# Patient Record
Sex: FEMALE | Race: WHITE | NOT HISPANIC OR LATINO | Employment: PART TIME | URBAN - METROPOLITAN AREA
[De-identification: names, ages, dates, MRNs, and addresses within clinical notes are randomized per-mention and may not be internally consistent; named-entity substitution may affect disease eponyms.]

---

## 2022-04-04 ENCOUNTER — TRANSFERRED RECORDS (OUTPATIENT)
Dept: MULTI SPECIALTY CLINIC | Facility: CLINIC | Age: 42
End: 2022-04-04

## 2022-04-04 LAB — PAP SMEAR - HIM PATIENT REPORTED: NEGATIVE

## 2023-03-17 ENCOUNTER — E-VISIT (OUTPATIENT)
Dept: URGENT CARE | Facility: CLINIC | Age: 43
End: 2023-03-17
Payer: COMMERCIAL

## 2023-03-17 DIAGNOSIS — N76.0 ACUTE VAGINITIS: Primary | ICD-10-CM

## 2023-03-17 PROCEDURE — 99421 OL DIG E/M SVC 5-10 MIN: CPT | Performed by: PREVENTIVE MEDICINE

## 2023-03-17 NOTE — PATIENT INSTRUCTIONS
Thank you for choosing us for your care. Given your symptoms, I would like you to do a lab-only visit to determine what is causing them.  I have placed the orders.  Please schedule an appointment with the lab right here in [facility], or call [phone].  I will let you know when the results are back and next steps to take.    Preventing Vaginitis     Use mild, unscented soap when you bathe or shower to avoid irritating your vagina.      Vaginitis is irritation or infection of the vagina or the outside opening of it (vulva). Vaginitis can be caused by bacteria, viruses, parasites, or yeast. Chemicals such as in perfumes or soaps or in spermicides can sometimes be a cause. Vaginitis can be caused by hormone changes in pregnancy or with menopause. You can help prevent vaginitis. Follow the tips below. And see your healthcare provider if you have any symptoms.   Hygiene    Stay away from chemicals. Don't use vaginal sprays. Don't use scented toilet paper or tampons that are scented. Sprays and scents have chemicals that can irritate your vagina.    Don't douche unless you are told to by your healthcare provider. Douching is rarely needed. And it upsets the normal balance in the vagina.    Wash yourself well. Wash the outer vaginal area (vulva) every day with mild, unscented soap. Keep it as dry as possible.    Wipe correctly. Make sure to wipe from front to back after a bowel movement. This helps keep from spreading bacteria from your anus to your vagina.    Change your tampon often. During your period, make sure to change your tampon as often as directed on the package. This allows the normal flow of vaginal discharge and blood.    Lifestyle    Limit your number of sexual partners. The more partners you have, the greater your risk of infection. Using condoms helps reduce your risk.    Get enough sleep. Sleep helps keep your body s immune system healthy. This helps you fight infection.    Lose weight, if needed.  Excess weight can reduce air circulation around your vagina. This can increase your risk of infection.    Exercise regularly. Regular activity helps keep your body healthy.    Take antibiotics only as directed. Antibiotics can change the normal chemical balance in the vagina.      Clothing    Don t sit in wet clothes. Yeast thrives when it s warm and damp.    Don t wear tight pants. And don t wear tights, leggings, or hose without a cotton crotch. These types of clothing trap warmth and moisture.    Wear cotton underwear. Cotton lets air circulate around the vagina.    Symptoms of vaginitis    Irritation, swelling, or itching of the genital area    Vaginal discharge    Bad vaginal odor    Pain or burning during urination    [employer] last reviewed this educational content on 11/1/2019 2000-2022 [employer]. All rights reserved. This information is not intended as a substitute for professional medical care. Always follow your healthcare professional's instructions.

## 2023-05-20 ENCOUNTER — HEALTH MAINTENANCE LETTER (OUTPATIENT)
Age: 43
End: 2023-05-20

## 2024-04-02 ENCOUNTER — OFFICE VISIT (OUTPATIENT)
Dept: FAMILY MEDICINE | Facility: CLINIC | Age: 44
End: 2024-04-02
Payer: COMMERCIAL

## 2024-04-02 VITALS
DIASTOLIC BLOOD PRESSURE: 66 MMHG | BODY MASS INDEX: 28.7 KG/M2 | SYSTOLIC BLOOD PRESSURE: 118 MMHG | HEIGHT: 63 IN | OXYGEN SATURATION: 98 % | TEMPERATURE: 98.1 F | WEIGHT: 162 LBS | HEART RATE: 77 BPM

## 2024-04-02 DIAGNOSIS — R19.7 DIARRHEA, UNSPECIFIED TYPE: ICD-10-CM

## 2024-04-02 DIAGNOSIS — Z11.59 NEED FOR HEPATITIS C SCREENING TEST: ICD-10-CM

## 2024-04-02 DIAGNOSIS — Z00.00 ROUTINE GENERAL MEDICAL EXAMINATION AT A HEALTH CARE FACILITY: Primary | ICD-10-CM

## 2024-04-02 DIAGNOSIS — Z13.6 CARDIOVASCULAR SCREENING; LDL GOAL LESS THAN 160: ICD-10-CM

## 2024-04-02 LAB
ALBUMIN SERPL BCG-MCNC: 4.5 G/DL (ref 3.5–5.2)
ALP SERPL-CCNC: 66 U/L (ref 40–150)
ALT SERPL W P-5'-P-CCNC: 28 U/L (ref 0–50)
ANION GAP SERPL CALCULATED.3IONS-SCNC: 10 MMOL/L (ref 7–15)
AST SERPL W P-5'-P-CCNC: 21 U/L (ref 0–45)
BASOPHILS # BLD AUTO: 0 10E3/UL (ref 0–0.2)
BASOPHILS NFR BLD AUTO: 1 %
BILIRUB SERPL-MCNC: 0.7 MG/DL
BUN SERPL-MCNC: 11.2 MG/DL (ref 6–20)
C DIFF TOX B STL QL: NEGATIVE
CALCIUM SERPL-MCNC: 8.6 MG/DL (ref 8.6–10)
CHLORIDE SERPL-SCNC: 106 MMOL/L (ref 98–107)
CHOLEST SERPL-MCNC: 183 MG/DL
CREAT SERPL-MCNC: 0.84 MG/DL (ref 0.51–0.95)
CRP SERPL-MCNC: <3 MG/L
DEPRECATED HCO3 PLAS-SCNC: 24 MMOL/L (ref 22–29)
EGFRCR SERPLBLD CKD-EPI 2021: 87 ML/MIN/1.73M2
EOSINOPHIL # BLD AUTO: 0.2 10E3/UL (ref 0–0.7)
EOSINOPHIL NFR BLD AUTO: 3 %
ERYTHROCYTE [DISTWIDTH] IN BLOOD BY AUTOMATED COUNT: 12 % (ref 10–15)
ERYTHROCYTE [SEDIMENTATION RATE] IN BLOOD BY WESTERGREN METHOD: 3 MM/HR (ref 0–20)
FASTING STATUS PATIENT QL REPORTED: NO
FERRITIN SERPL-MCNC: 230 NG/ML (ref 6–175)
GLUCOSE SERPL-MCNC: 99 MG/DL (ref 70–99)
HCT VFR BLD AUTO: 40.1 % (ref 35–47)
HCV AB SERPL QL IA: NONREACTIVE
HDLC SERPL-MCNC: 69 MG/DL
HGB BLD-MCNC: 13.3 G/DL (ref 11.7–15.7)
IMM GRANULOCYTES # BLD: 0 10E3/UL
IMM GRANULOCYTES NFR BLD: 0 %
LDLC SERPL CALC-MCNC: 93 MG/DL
LYMPHOCYTES # BLD AUTO: 2.4 10E3/UL (ref 0.8–5.3)
LYMPHOCYTES NFR BLD AUTO: 27 %
MCH RBC QN AUTO: 33.8 PG (ref 26.5–33)
MCHC RBC AUTO-ENTMCNC: 33.2 G/DL (ref 31.5–36.5)
MCV RBC AUTO: 102 FL (ref 78–100)
MONOCYTES # BLD AUTO: 0.6 10E3/UL (ref 0–1.3)
MONOCYTES NFR BLD AUTO: 7 %
NEUTROPHILS # BLD AUTO: 5.4 10E3/UL (ref 1.6–8.3)
NEUTROPHILS NFR BLD AUTO: 62 %
NONHDLC SERPL-MCNC: 114 MG/DL
PLATELET # BLD AUTO: 216 10E3/UL (ref 150–450)
POTASSIUM SERPL-SCNC: 4.1 MMOL/L (ref 3.4–5.3)
PROT SERPL-MCNC: 6.5 G/DL (ref 6.4–8.3)
RBC # BLD AUTO: 3.93 10E6/UL (ref 3.8–5.2)
SODIUM SERPL-SCNC: 140 MMOL/L (ref 135–145)
TRIGL SERPL-MCNC: 104 MG/DL
TSH SERPL DL<=0.005 MIU/L-ACNC: 2.59 UIU/ML (ref 0.3–4.2)
WBC # BLD AUTO: 8.6 10E3/UL (ref 4–11)

## 2024-04-02 PROCEDURE — 36415 COLL VENOUS BLD VENIPUNCTURE: CPT | Performed by: PHYSICIAN ASSISTANT

## 2024-04-02 PROCEDURE — 99214 OFFICE O/P EST MOD 30 MIN: CPT | Mod: 25 | Performed by: PHYSICIAN ASSISTANT

## 2024-04-02 PROCEDURE — 84443 ASSAY THYROID STIM HORMONE: CPT | Performed by: PHYSICIAN ASSISTANT

## 2024-04-02 PROCEDURE — 86140 C-REACTIVE PROTEIN: CPT | Performed by: PHYSICIAN ASSISTANT

## 2024-04-02 PROCEDURE — 85652 RBC SED RATE AUTOMATED: CPT | Performed by: PHYSICIAN ASSISTANT

## 2024-04-02 PROCEDURE — 87493 C DIFF AMPLIFIED PROBE: CPT | Mod: 59 | Performed by: PHYSICIAN ASSISTANT

## 2024-04-02 PROCEDURE — 99386 PREV VISIT NEW AGE 40-64: CPT | Performed by: PHYSICIAN ASSISTANT

## 2024-04-02 PROCEDURE — 83993 ASSAY FOR CALPROTECTIN FECAL: CPT | Performed by: PHYSICIAN ASSISTANT

## 2024-04-02 PROCEDURE — 87338 HPYLORI STOOL AG IA: CPT | Performed by: PHYSICIAN ASSISTANT

## 2024-04-02 PROCEDURE — 80061 LIPID PANEL: CPT | Performed by: PHYSICIAN ASSISTANT

## 2024-04-02 PROCEDURE — 80053 COMPREHEN METABOLIC PANEL: CPT | Performed by: PHYSICIAN ASSISTANT

## 2024-04-02 PROCEDURE — 86803 HEPATITIS C AB TEST: CPT | Performed by: PHYSICIAN ASSISTANT

## 2024-04-02 PROCEDURE — 85025 COMPLETE CBC W/AUTO DIFF WBC: CPT | Performed by: PHYSICIAN ASSISTANT

## 2024-04-02 PROCEDURE — 82728 ASSAY OF FERRITIN: CPT | Performed by: PHYSICIAN ASSISTANT

## 2024-04-02 PROCEDURE — 87507 IADNA-DNA/RNA PROBE TQ 12-25: CPT | Performed by: PHYSICIAN ASSISTANT

## 2024-04-02 SDOH — HEALTH STABILITY: PHYSICAL HEALTH: ON AVERAGE, HOW MANY DAYS PER WEEK DO YOU ENGAGE IN MODERATE TO STRENUOUS EXERCISE (LIKE A BRISK WALK)?: 4 DAYS

## 2024-04-02 ASSESSMENT — SOCIAL DETERMINANTS OF HEALTH (SDOH): HOW OFTEN DO YOU GET TOGETHER WITH FRIENDS OR RELATIVES?: TWICE A WEEK

## 2024-04-02 NOTE — Clinical Note
Please abstract the following data from this visit with this patient into the appropriate field in Epic:  Tests that can be patient reported without a hard copy:  Pap smear done on this date: 4/4/2022 (approximately), by this group: [facility], results were NIL, negative HPV.

## 2024-04-02 NOTE — PROGRESS NOTES
"Preventive Care Visit  [facility] NICHOLE Pérez PA-C, Family Medicine  Apr 2, 2024      Assessment & Plan     (Z00.00) Routine general medical examination at a health care facility  (primary encounter diagnosis)  Comment:   Plan: Lipid panel reflex to direct LDL Non-fasting            (R19.7) Diarrhea, unspecified type  Comment: abrupt onset 6 weeks ago with no slowing down. No recent abx use. No recent travel. On no prescription or over the counter medications. Discussed starting with labs and stools studies today. If all normal, would proceed to colonoscopy and given report or diarrhea being black, would consider addition of endoscopy. Denies upper GI symptoms and is taking pepto regularly but states stools were that color prior to initiation of pepto  Plan: CBC with platelets and differential, Ferritin,         C. difficile Toxin B PCR with reflex to C.         difficile Antigen and Toxins A/B EIA, Enteric         Bacteria and Virus Panel by [facility] Stool,         Calprotectin Feces, ESR: Erythrocyte         sedimentation rate, CRP, inflammation,         Helicobacter pylori Antigen Stool, TSH with         free T4 reflex, Comprehensive metabolic panel         (BMP + Alb, Alk Phos, ALT, AST, Total. Bili,         TP)            (Z11.59) Need for hepatitis C screening test  Comment:   Plan: Hepatitis C Screen Reflex to HCV RNA Quant and         Genotype            (Z13.6) CARDIOVASCULAR SCREENING; LDL GOAL LESS THAN 160  Comment:   Plan: Lipid panel reflex to direct LDL Non-fasting                Patient has been advised of split billing requirements and indicates understanding: Yes    BMI  Estimated body mass index is 28.7 kg/m  as calculated from the following:    Height as of this encounter: 1.6 m (5' 3\").    Weight as of this encounter: 73.5 kg (162 lb).       Counseling  Appropriate preventive services were discussed with this patient, including applicable screening as appropriate for fall " prevention, nutrition, physical activity, Tobacco-use cessation, weight loss and cognition.  Checklist reviewing preventive services available has been given to the patient.  Reviewed patient's diet, addressing concerns and/or questions.   She is at risk for psychosocial distress and has been provided with information to reduce risk.   The patient reports drinking more than one alcoholic drink per day and sometimes engages in binge or excessive drinking. The patient was counseled and given information about possible harmful effects of excessive alcohol intake as well as where to get help for alcohol problems.     Subjective   Glenda Chang is a 44 year old, presenting for the following:  Physical        4/2/2024     8:50 AM   Additional Questions   Roomed by HUGH Chaney        Health Care Directive  Patient does not have a Health Care Directive or Living Will: Discussed advance care planning with patient; however, patient declined at this time.    HPI    -She has been having diarrhea for the last 6 weeks.     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 They did go to [place] in Feb but this had already been going on. Interestingly diarrhea was not as bad in [place] - still present but less so and then when she got home increased in frequency again    Stools prior to this were 'normal' - typically 2x/day  No h/o IBS issues or IBD  Has a cousin with Crohns otherwise no family history    Other than some fatigue she has been feeling okay  No  change in appetite  No weight loss    She does do her women's health care (PAP and mammos) through [facility]. Last PAP ~2 years ago. No h/o abnormal PAPs      4/2/2024   General Health   How would you rate your overall physical health? (!) FAIR   Feel stress (tense, anxious, or unable to sleep) Only a little   (!) STRESS CONCERN      4/2/2024   Nutrition   Three or more servings of calcium each day? Yes   Diet: Regular (no restrictions)   How many servings of fruit and vegetables per day? (!) 2-3   How many sweetened beverages each day? (!) 2         4/2/2024   Exercise   Days per week of moderate/strenous exercise 4 days         4/2/2024   Social Factors   Frequency of gathering with friends or relatives Twice a week   Worry food won't last until get money to buy more No   Food not last or not have enough money for food? No   Do you have housing?  Yes   Are you worried about losing your housing? No   Lack of transportation? No   Unable to get utilities (heat,electricity)? No         4/2/2024   Dental   Dentist two times every year? Yes         4/2/2024   TB Screening   Were you born outside of the [place]? No         Today's PHQ-2 Score:       4/2/2024     8:48 AM   PHQ-2 ( 1999 [employer])   Q1: Little interest or pleasure in doing things 0   Q2: Feeling down, depressed or hopeless 0   PHQ-2 Score 0   Q1: Little interest or pleasure in doing things Not at all   Q2: Feeling down, depressed or hopeless Not at all   PHQ-2 Score 0           4/2/2024   Substance Use   Alcohol more than 3/day or more than 7/wk Yes   How often do you have a drink containing alcohol 2 to 3 times a week   How many alcohol drinks on typical day 1 or 2   How often do you have 5+ drinks at one occasion Never   Audit 2/3 Score 0   How often not able to stop drinking once started Never   How often failed to do what normally expected Never   How often needed first drink in am after a heavy drinking session Never   How often feeling of guilt or  remorse after drinking Never   How often unable to remember what happened the night before Never   Have you or someone else been injured because of your drinking No   Has anyone been concerned or suggested you cut down on drinking No   TOTAL SCORE - AUDIT 3   Do you use any other substances recreationally? No     Social History     Tobacco Use    Smoking status: Former    Smokeless tobacco: Never   Substance Use Topics    Alcohol use: No    Drug use: No             4/2/2024   Breast Cancer Screening   Family history of breast, colon, or ovarian cancer? Yes         4/2/2024   LAST FHS-7 RESULTS   1st degree relative breast or ovarian cancer No   Any relative bilateral breast cancer No   Any male have breast cancer No   Any ONE woman have BOTH breast AND ovarian cancer No   Any woman with breast cancer before 50yrs No   2 or more relatives with breast AND/OR ovarian cancer No   2 or more relatives with breast AND/OR bowel cancer No       Mammogram Screening - Mammogram every 1-2 years updated in Health Maintenance based on mutual decision making        4/2/2024   STI Screening   New sexual partner(s) since last STI/HIV test? No     History of abnormal Pap smear: NO - age 30-65 PAP every 5 years with negative HPV co-testing recommended       ASCVD Risk   The ASCVD Risk score (Anurag JAIME, et al., 2019) failed to calculate for the following reasons:    Cannot find a previous HDL lab    Cannot find a previous total cholesterol lab        4/2/2024   Contraception/Family Planning   Questions about contraception or family planning No        Reviewed and updated as needed this visit by Provider                    BP Readings from Last 3 Encounters:   04/02/24 118/66    Wt Readings from Last 3 Encounters:   04/02/24 73.5 kg (162 lb)   09/29/15 78 kg (172 lb)                      Review of Systems  CONSTITUTIONAL: NEGATIVE for fever, chills, change in weight  INTEGUMENTARY/SKIN: NEGATIVE for worrisome rashes, moles or  "lesions  EYES: NEGATIVE for vision changes or irritation  ENT/MOUTH: NEGATIVE for ear, mouth and throat problems  RESP: NEGATIVE for significant cough or SOB  BREAST: NEGATIVE for masses, tenderness or discharge  CV: NEGATIVE for chest pain, palpitations or peripheral edema  GI: POSITIVE for diarrhea as noted above  : NEGATIVE for frequency, dysuria, or hematuria  MUSCULOSKELETAL: NEGATIVE for significant arthralgias or myalgia  NEURO: NEGATIVE for weakness, dizziness or paresthesias  ENDOCRINE: NEGATIVE for temperature intolerance, skin/hair changes  HEME: NEGATIVE for bleeding problems  PSYCHIATRIC: NEGATIVE for changes in mood or affect     Objective    Exam  /66   Pulse 77   Temp 98.1  F (36.7  C) (Tympanic)   Ht 1.6 m (5' 3\")   Wt 73.5 kg (162 lb)   SpO2 98%   BMI 28.70 kg/m     Estimated body mass index is 28.7 kg/m  as calculated from the following:    Height as of this encounter: 1.6 m (5' 3\").    Weight as of this encounter: 73.5 kg (162 lb).    Physical Exam  GENERAL: alert and no distress  EYES: Eyes grossly normal to inspection, PERRL and conjunctivae and sclerae normal  HENT: ear canals and TM's normal, nose and mouth without ulcers or lesions  NECK: no adenopathy, no asymmetry, masses, or scars  RESP: lungs clear to auscultation - no rales, rhonchi or wheezes  CV: regular rate and rhythm, normal S1 S2, no S3 or S4, no murmur, click or rub, no peripheral edema  ABDOMEN: soft, nontender, no hepatosplenomegaly, no masses and bowel sounds normal  MS: no gross musculoskeletal defects noted, no edema  SKIN: no suspicious lesions or rashes  NEURO: Normal strength and tone, mentation intact and speech normal  PSYCH: mentation appears normal, affect normal/bright        Signed Electronically by: Rosa Pérez PA-C    "

## 2024-04-02 NOTE — PATIENT INSTRUCTIONS
Preventive Care Advice   This is general advice given by our system to help you stay healthy. However, your care team may have specific advice just for you. Please talk to your care team about your preventive care needs.  Nutrition  Eat 5 or more servings of fruits and vegetables each day.  Try wheat bread, brown rice and whole grain pasta (instead of white bread, rice, and pasta).  Get enough calcium and vitamin D. Check the label on foods and aim for 100% of the RDA (recommended daily allowance).  Lifestyle  Exercise at least 150 minutes each week   (30 minutes a day, 5 days a week).  Do muscle strengthening activities 2 days a week. These help control your weight and prevent disease.  No smoking.  Wear sunscreen to prevent skin cancer.  Have a dental exam and cleaning every 6 months.  Yearly exams  See your health care team every year to talk about:  Any changes in your health.  Any medicines your care team has prescribed.  Preventive care, family planning, and ways to prevent chronic diseases.  Shots (vaccines)   HPV shots (up to age 26), if you've never had them before.  Hepatitis B shots (up to age 59), if you've never had them before.  COVID-19 shot: Get this shot when it's due.  Flu shot: Get a flu shot every year.  Tetanus shot: Get a tetanus shot every 10 years.  Pneumococcal, hepatitis A, and RSV shots: Ask your care team if you need these based on your risk.  Shingles shot (for age 50 and up).  General health tests  Diabetes screening:  Starting at age 35, Get screened for diabetes at least every 3 years.  If you are younger than age 35, ask your care team if you should be screened for diabetes.  Cholesterol test: At age 39, start having a cholesterol test every 5 years, or more often if advised.  Bone density scan (DEXA): At age 50, ask your care team if you should have this scan for osteoporosis (brittle bones).  Hepatitis C: Get tested at least once in your life.  STIs (sexually transmitted  infections)  Before age 24: Ask your care team if you should be screened for STIs.  After age 24: Get screened for STIs if you're at risk. You are at risk for STIs (including HIV) if:  You are sexually active with more than one person.  You don't use condoms every time.  You or a partner was diagnosed with a sexually transmitted infection.  If you are at risk for HIV, ask about PrEP medicine to prevent HIV.  Get tested for HIV at least once in your life, whether you are at risk for HIV or not.  Cancer screening tests  Cervical cancer screening: If you have a cervix, begin getting regular cervical cancer screening tests at age 21. Most people who have regular screenings with normal results can stop after age 65. Talk about this with your provider.  Breast cancer scan (mammogram): If you've ever had breasts, begin having regular mammograms starting at age 40. This is a scan to check for breast cancer.  Colon cancer screening: It is important to start screening for colon cancer at age 45.  Have a colonoscopy test every 10 years (or more often if you're at risk) Or, ask your provider about stool tests like a FIT test every year or Cologuard test every 3 years.  To learn more about your testing options, visit: https://www.[place] [employer]/293601.pdf.  For help making a decision, visit: https://bit.ly/wr84329.  Prostate cancer screening test: If you have a prostate and are age 55 to 69, ask your provider if you would benefit from a yearly prostate cancer screening test.  Lung cancer screening: If you are a current or former smoker age 50 to 80, ask your care team if ongoing lung cancer screenings are right for you.  For informational purposes only. Not to replace the advice of your health care provider. Copyright   2023 [employer]. All rights reserved. Clinically reviewed by the [facility]. [place] 387302 - REV 01/24.    Learning About Stress  What is stress?     Stress is your  body's response to a hard situation. Your body can have a physical, emotional, or mental response. Stress is a fact of life for most people, and it affects everyone differently. What causes stress for you may not be stressful for someone else.  A lot of things can cause stress. You may feel stress when you go on a job interview, take a test, or run a race. This kind of short-term stress is normal and even useful. It can help you if you need to work hard or react quickly. For example, stress can help you finish an important job on time.  Long-term stress is caused by ongoing stressful situations or events. Examples of long-term stress include long-term health problems, ongoing problems at work, or conflicts in your family. Long-term stress can harm your health.  How does stress affect your health?  When you are stressed, your body responds as though you are in danger. It makes hormones that speed up your heart, make you breathe faster, and give you a burst of energy. This is called the fight-or-flight stress response. If the stress is over quickly, your body goes back to normal and no harm is done.  But if stress happens too often or lasts too long, it can have bad effects. Long-term stress can make you more likely to get sick, and it can make symptoms of some diseases worse. If you tense up when you are stressed, you may develop neck, shoulder, or low back pain. Stress is linked to high blood pressure and heart disease.  Stress also harms your emotional health. It can make you armenta, tense, or depressed. Your relationships may suffer, and you may not do well at work or school.  What can you do to manage stress?  You can try these things to help manage stress:   Do something active. Exercise or activity can help reduce stress. Walking is a great way to get started. Even everyday activities such as housecleaning or yard work can help.  Try yoga or dorys chi. These techniques combine exercise and meditation. You may need  some training at first to learn them.  Do something you enjoy. For example, listen to music or go to a movie. Practice your hobby or do volunteer work.  Meditate. This can help you relax, because you are not worrying about what happened before or what may happen in the future.  Do guided imagery. Imagine yourself in any setting that helps you feel calm. You can use online videos, books, or a teacher to guide you.  Do breathing exercises. For example:  From a standing position, bend forward from the waist with your knees slightly bent. Let your arms dangle close to the floor.  Breathe in slowly and deeply as you return to a standing position. Roll up slowly and lift your head last.  Hold your breath for just a few seconds in the standing position.  Breathe out slowly and bend forward from the waist.  Let your feelings out. Talk, laugh, cry, and express anger when you need to. Talking with supportive friends or family, a counselor, or a quin leader about your feelings is a healthy way to relieve stress. Avoid discussing your feelings with people who make you feel worse.  Write. It may help to write about things that are bothering you. This helps you find out how much stress you feel and what is causing it. When you know this, you can find better ways to cope.  What can you do to prevent stress?  You might try some of these things to help prevent stress:  Manage your time. This helps you find time to do the things you want and need to do.  Get enough sleep. Your body recovers from the stresses of the day while you are sleeping.  Get support. Your family, friends, and community can make a difference in how you experience stress.  Limit your news feed. Avoid or limit time on social media or news that may make you feel stressed.  Do something active. Exercise or activity can help reduce stress. Walking is a great way to get started.  Where can you learn more?  Go to https://www.healthwise.net/patiented  Enter N032 in the  "search box to learn more about \"Learning About Stress.\"  Current as of: October 24, 2023               Content Version: 14.0    [id number] [employer].   Care instructions adapted under license by your healthcare professional. If you have questions about a medical condition or this instruction, always ask your healthcare professional. [employer] disclaims any warranty or liability for your use of this information.      Learning About Alcohol Use Disorder  What is alcohol use disorder?  Alcohol use disorder means that a person drinks alcohol even though it causes harm to themselves or others. It can range from mild to severe. The more symptoms of this disorder you have, the more severe it may be. People who have it may find it hard to control their use of alcohol.  People who have this disorder may argue with others about how much they're drinking. Their job may be affected because of drinking. They may drink when it's dangerous or illegal, such as when they drive. They also may have a strong need, or craving, to drink. They may feel like they must drink just to get by. Their drinking may increase their risk of getting hurt or being in a car crash.  Over time, drinking too much alcohol may cause health problems. These may include high blood pressure, liver problems, or problems with digestion.  What are the symptoms?  Maybe you've wondered about your alcohol habits or how to tell if your drinking is becoming a problem.  Here are some of the symptoms of alcohol use disorder. You may have it if you have two or more of the following symptoms:  You drink larger amounts of alcohol than you ever meant to. Or you've been drinking for a longer time than you ever meant to.  You can't cut down or control your use. Or you constantly wish you could cut down.  You spend a lot of time getting or drinking alcohol or recovering from its effects.  You have strong cravings for alcohol.  You can no longer do " your main jobs at work, at school, or at home.  You keep drinking alcohol, even though your use hurts your relationships.  You have stopped doing important activities because of your alcohol use.  You drink alcohol in situations where doing so is dangerous.  You keep drinking alcohol even though you know it's causing health problems.  You need more and more alcohol to get the same effect, or you get less effect from the same amount over time. This is called tolerance.  You have uncomfortable symptoms when you stop drinking alcohol or use less. This is called withdrawal.  Alcohol use disorder can range from mild to severe. The more symptoms you have, the more severe the disorder may be.  You might not realize that your drinking is a problem. You might not drink large amounts when you drink. Or you might go for days or weeks between drinking episodes. But even if you don't drink very often, your drinking could still be harmful and put you at risk.  How is alcohol use disorder treated?  Getting help is up to you. But you don't have to do it alone. There are many people and kinds of treatments that can help.  Treatment for alcohol use disorder can include:  Group therapy, one or more types of counseling, and alcohol education.  Medicines that help to:  Reduce withdrawal symptoms and help you safely stop drinking.  Reduce cravings for alcohol.  Support groups. These groups include Alcoholics Anonymous and Bl[employer] (Self-Management and Recovery Training).  Some people are able to stop or cut back on drinking with help from a counselor. People who have moderate to severe alcohol use disorder may need medical treatment. They may need to stay in a hospital or treatment center.  You may have a treatment team to help you. This team may include a psychologist or psychiatrist, counselors, doctors, social workers, nurses, and a . A  helps plan and manage your treatment.  Follow-up care is a key part  "of your treatment and safety. Be sure to make and go to all appointments, and call your doctor if you are having problems. It's also a good idea to know your test results and keep a list of the medicines you take.  Where can you learn more?  Go to https://www.[employer].net/patiented  Enter H758 in the search box to learn more about \"Learning About Alcohol Use Disorder.\"  Current as of: November 15, 2023               Content Version: 14.0    3137-3054 [employer].   Care instructions adapted under license by your healthcare professional. If you have questions about a medical condition or this instruction, always ask your healthcare professional. [employer] disclaims any warranty or liability for your use of this information.      "

## 2024-04-03 LAB
ADV 40+41 DNA STL QL NAA+NON-PROBE: NEGATIVE
ASTRO TYP 1-8 RNA STL QL NAA+NON-PROBE: NEGATIVE
C CAYETANENSIS DNA STL QL NAA+NON-PROBE: NEGATIVE
CALPROTECTIN STL-MCNT: 11.1 MG/KG (ref 0–49.9)
CAMPYLOBACTER DNA SPEC NAA+PROBE: NEGATIVE
CRYPTOSP DNA STL QL NAA+NON-PROBE: NEGATIVE
E COLI O157 DNA STL QL NAA+NON-PROBE: NORMAL
E HISTOLYT DNA STL QL NAA+NON-PROBE: NEGATIVE
EAEC ASTA GENE ISLT QL NAA+PROBE: NEGATIVE
EC STX1+STX2 GENES STL QL NAA+NON-PROBE: NEGATIVE
EPEC EAE GENE STL QL NAA+NON-PROBE: NEGATIVE
ETEC LTA+ST1A+ST1B TOX ST NAA+NON-PROBE: NEGATIVE
G LAMBLIA DNA STL QL NAA+NON-PROBE: NEGATIVE
H PYLORI AG STL QL IA: NEGATIVE
NOROVIRUS GI+II RNA STL QL NAA+NON-PROBE: NEGATIVE
P SHIGELLOIDES DNA STL QL NAA+NON-PROBE: NEGATIVE
RVA RNA STL QL NAA+NON-PROBE: NEGATIVE
SALMONELLA SP RPOD STL QL NAA+PROBE: NEGATIVE
SAPO I+II+IV+V RNA STL QL NAA+NON-PROBE: NEGATIVE
SHIGELLA SP+EIEC IPAH ST NAA+NON-PROBE: NEGATIVE
V CHOLERAE DNA SPEC QL NAA+PROBE: NEGATIVE
VIBRIO DNA SPEC NAA+PROBE: NEGATIVE
Y ENTEROCOL DNA STL QL NAA+PROBE: NEGATIVE

## 2024-04-04 DIAGNOSIS — R19.7 DIARRHEA, UNSPECIFIED TYPE: Primary | ICD-10-CM

## 2024-04-10 ENCOUNTER — HOSPITAL ENCOUNTER (OUTPATIENT)
Facility: CLINIC | Age: 44
End: 2024-04-10
Attending: SURGERY | Admitting: SURGERY
Payer: COMMERCIAL

## 2024-05-18 ENCOUNTER — HEALTH MAINTENANCE LETTER (OUTPATIENT)
Age: 44
End: 2024-05-18

## 2025-05-10 ENCOUNTER — HOSPITAL ENCOUNTER (INPATIENT)
Facility: HOSPITAL | Age: 45
LOS: 1 days | Discharge: HOME | End: 2025-05-12
Attending: EMERGENCY MEDICINE | Admitting: INTERNAL MEDICINE
Payer: COMMERCIAL

## 2025-05-10 ENCOUNTER — APPOINTMENT (OUTPATIENT)
Dept: RADIOLOGY | Facility: HOSPITAL | Age: 45
End: 2025-05-10
Attending: STUDENT IN AN ORGANIZED HEALTH CARE EDUCATION/TRAINING PROGRAM
Payer: COMMERCIAL

## 2025-05-10 ENCOUNTER — ANESTHESIA (OUTPATIENT)
Dept: SURGERY | Facility: HOSPITAL | Age: 45
End: 2025-05-10
Payer: COMMERCIAL

## 2025-05-10 ENCOUNTER — ANESTHESIA EVENT (OUTPATIENT)
Dept: SURGERY | Facility: HOSPITAL | Age: 45
End: 2025-05-10
Payer: COMMERCIAL

## 2025-05-10 ENCOUNTER — APPOINTMENT (OUTPATIENT)
Dept: CT IMAGING | Facility: HOSPITAL | Age: 45
End: 2025-05-10
Attending: STUDENT IN AN ORGANIZED HEALTH CARE EDUCATION/TRAINING PROGRAM
Payer: COMMERCIAL

## 2025-05-10 DIAGNOSIS — S82.102A CLOSED FRACTURE OF PROXIMAL END OF LEFT TIBIA, UNSPECIFIED FRACTURE MORPHOLOGY, INITIAL ENCOUNTER: ICD-10-CM

## 2025-05-10 DIAGNOSIS — S82.402A TIBIA/FIBULA FRACTURE, LEFT, CLOSED, INITIAL ENCOUNTER: Primary | ICD-10-CM

## 2025-05-10 DIAGNOSIS — S82.202A TIBIA/FIBULA FRACTURE, LEFT, CLOSED, INITIAL ENCOUNTER: Primary | ICD-10-CM

## 2025-05-10 LAB
ANION GAP SERPL CALCULATED.3IONS-SCNC: 11 MMOL/L (ref 7–15)
BASOPHILS # BLD AUTO: 0.1 10E3/UL (ref 0–0.2)
BASOPHILS NFR BLD AUTO: 1 %
BUN SERPL-MCNC: 13 MG/DL (ref 6–20)
CALCIUM SERPL-MCNC: 9 MG/DL (ref 8.8–10.4)
CHLORIDE SERPL-SCNC: 108 MMOL/L (ref 98–107)
CK SERPL-CCNC: 154 U/L (ref 26–192)
CREAT SERPL-MCNC: 0.81 MG/DL (ref 0.51–0.95)
EGFRCR SERPLBLD CKD-EPI 2021: >90 ML/MIN/1.73M2
EOSINOPHIL # BLD AUTO: 0.1 10E3/UL (ref 0–0.7)
EOSINOPHIL NFR BLD AUTO: 1 %
ERYTHROCYTE [DISTWIDTH] IN BLOOD BY AUTOMATED COUNT: 11.9 % (ref 10–15)
GLUCOSE SERPL-MCNC: 108 MG/DL (ref 70–99)
HCO3 SERPL-SCNC: 22 MMOL/L (ref 22–29)
HCT VFR BLD AUTO: 40.1 % (ref 35–47)
HGB BLD-MCNC: 13.2 G/DL (ref 11.7–15.7)
IMM GRANULOCYTES # BLD: 0 10E3/UL
IMM GRANULOCYTES NFR BLD: 0 %
LYMPHOCYTES # BLD AUTO: 2.1 10E3/UL (ref 0.8–5.3)
LYMPHOCYTES NFR BLD AUTO: 16 %
MCH RBC QN AUTO: 33.2 PG (ref 26.5–33)
MCHC RBC AUTO-ENTMCNC: 32.9 G/DL (ref 31.5–36.5)
MCV RBC AUTO: 101 FL (ref 78–100)
MONOCYTES # BLD AUTO: 0.8 10E3/UL (ref 0–1.3)
MONOCYTES NFR BLD AUTO: 6 %
NEUTROPHILS # BLD AUTO: 10 10E3/UL (ref 1.6–8.3)
NEUTROPHILS NFR BLD AUTO: 77 %
NRBC # BLD AUTO: 0 10E3/UL
NRBC BLD AUTO-RTO: 0 /100
PLATELET # BLD AUTO: 275 10E3/UL (ref 150–450)
POTASSIUM SERPL-SCNC: 4.1 MMOL/L (ref 3.4–5.3)
RBC # BLD AUTO: 3.97 10E6/UL (ref 3.8–5.2)
SODIUM SERPL-SCNC: 141 MMOL/L (ref 135–145)
WBC # BLD AUTO: 13 10E3/UL (ref 4–11)

## 2025-05-10 PROCEDURE — 250N000011 HC RX IP 250 OP 636: Mod: JZ | Performed by: EMERGENCY MEDICINE

## 2025-05-10 PROCEDURE — 250N000013 HC RX MED GY IP 250 OP 250 PS 637: Performed by: STUDENT IN AN ORGANIZED HEALTH CARE EDUCATION/TRAINING PROGRAM

## 2025-05-10 PROCEDURE — 250N000011 HC RX IP 250 OP 636: Performed by: NURSE ANESTHETIST, CERTIFIED REGISTERED

## 2025-05-10 PROCEDURE — 999N000141 HC STATISTIC PRE-PROCEDURE NURSING ASSESSMENT: Performed by: STUDENT IN AN ORGANIZED HEALTH CARE EDUCATION/TRAINING PROGRAM

## 2025-05-10 PROCEDURE — 999N000065 XR TIBIA & FIBULA PORT LEFT 2 VIEWS: Mod: LT

## 2025-05-10 PROCEDURE — 0QSH06Z REPOSITION LEFT TIBIA WITH INTRAMEDULLARY INTERNAL FIXATION DEVICE, OPEN APPROACH: ICD-10-PCS | Performed by: STUDENT IN AN ORGANIZED HEALTH CARE EDUCATION/TRAINING PROGRAM

## 2025-05-10 PROCEDURE — 73700 CT LOWER EXTREMITY W/O DYE: CPT | Mod: LT

## 2025-05-10 PROCEDURE — G0378 HOSPITAL OBSERVATION PER HR: HCPCS

## 2025-05-10 PROCEDURE — 0QHH34Z INSERTION OF INTERNAL FIXATION DEVICE INTO LEFT TIBIA, PERCUTANEOUS APPROACH: ICD-10-PCS | Performed by: STUDENT IN AN ORGANIZED HEALTH CARE EDUCATION/TRAINING PROGRAM

## 2025-05-10 PROCEDURE — 250N000009 HC RX 250: Performed by: STUDENT IN AN ORGANIZED HEALTH CARE EDUCATION/TRAINING PROGRAM

## 2025-05-10 PROCEDURE — 250N000009 HC RX 250: Performed by: NURSE ANESTHETIST, CERTIFIED REGISTERED

## 2025-05-10 PROCEDURE — 96374 THER/PROPH/DIAG INJ IV PUSH: CPT

## 2025-05-10 PROCEDURE — 250N000013 HC RX MED GY IP 250 OP 250 PS 637

## 2025-05-10 PROCEDURE — 250N000025 HC SEVOFLURANE, PER MIN: Performed by: STUDENT IN AN ORGANIZED HEALTH CARE EDUCATION/TRAINING PROGRAM

## 2025-05-10 PROCEDURE — 258N000003 HC RX IP 258 OP 636: Performed by: STUDENT IN AN ORGANIZED HEALTH CARE EDUCATION/TRAINING PROGRAM

## 2025-05-10 PROCEDURE — C1713 ANCHOR/SCREW BN/BN,TIS/BN: HCPCS | Performed by: STUDENT IN AN ORGANIZED HEALTH CARE EDUCATION/TRAINING PROGRAM

## 2025-05-10 PROCEDURE — 710N000009 HC RECOVERY PHASE 1, LEVEL 1, PER MIN: Performed by: STUDENT IN AN ORGANIZED HEALTH CARE EDUCATION/TRAINING PROGRAM

## 2025-05-10 PROCEDURE — 370N000017 HC ANESTHESIA TECHNICAL FEE, PER MIN: Performed by: STUDENT IN AN ORGANIZED HEALTH CARE EDUCATION/TRAINING PROGRAM

## 2025-05-10 PROCEDURE — 999N000180 XR SURGERY CARM FLUORO LESS THAN 5 MIN

## 2025-05-10 PROCEDURE — 99223 1ST HOSP IP/OBS HIGH 75: CPT | Performed by: STUDENT IN AN ORGANIZED HEALTH CARE EDUCATION/TRAINING PROGRAM

## 2025-05-10 PROCEDURE — C1894 INTRO/SHEATH, NON-LASER: HCPCS | Performed by: STUDENT IN AN ORGANIZED HEALTH CARE EDUCATION/TRAINING PROGRAM

## 2025-05-10 PROCEDURE — 250N000011 HC RX IP 250 OP 636: Performed by: STUDENT IN AN ORGANIZED HEALTH CARE EDUCATION/TRAINING PROGRAM

## 2025-05-10 PROCEDURE — 85025 COMPLETE CBC W/AUTO DIFF WBC: CPT | Performed by: EMERGENCY MEDICINE

## 2025-05-10 PROCEDURE — 272N000001 HC OR GENERAL SUPPLY STERILE: Performed by: STUDENT IN AN ORGANIZED HEALTH CARE EDUCATION/TRAINING PROGRAM

## 2025-05-10 PROCEDURE — 258N000003 HC RX IP 258 OP 636: Performed by: NURSE ANESTHETIST, CERTIFIED REGISTERED

## 2025-05-10 PROCEDURE — 36415 COLL VENOUS BLD VENIPUNCTURE: CPT | Performed by: EMERGENCY MEDICINE

## 2025-05-10 PROCEDURE — 96376 TX/PRO/DX INJ SAME DRUG ADON: CPT

## 2025-05-10 PROCEDURE — 250N000011 HC RX IP 250 OP 636: Mod: JZ

## 2025-05-10 PROCEDURE — 272N000002 HC OR SUPPLY OTHER OPNP: Performed by: STUDENT IN AN ORGANIZED HEALTH CARE EDUCATION/TRAINING PROGRAM

## 2025-05-10 PROCEDURE — 360N000084 HC SURGERY LEVEL 4 W/ FLUORO, PER MIN: Performed by: STUDENT IN AN ORGANIZED HEALTH CARE EDUCATION/TRAINING PROGRAM

## 2025-05-10 PROCEDURE — 82550 ASSAY OF CK (CPK): CPT

## 2025-05-10 PROCEDURE — 80048 BASIC METABOLIC PNL TOTAL CA: CPT | Performed by: EMERGENCY MEDICINE

## 2025-05-10 PROCEDURE — 99207 PR APP CREDIT; MD BILLING SHARED VISIT: CPT | Mod: FS

## 2025-05-10 PROCEDURE — 99285 EMERGENCY DEPT VISIT HI MDM: CPT | Mod: 25

## 2025-05-10 DEVICE — GUIDE WIRE
Type: IMPLANTABLE DEVICE | Site: TIBIA | Status: FUNCTIONAL
Brand: T2 ALPHA

## 2025-05-10 DEVICE — LOCKING SCREW
Type: IMPLANTABLE DEVICE | Site: TIBIA | Status: FUNCTIONAL
Brand: T2 ALPHA

## 2025-05-10 DEVICE — IMPLANTABLE DEVICE: Type: IMPLANTABLE DEVICE | Site: TIBIA | Status: FUNCTIONAL

## 2025-05-10 DEVICE — TIBIAL NAIL
Type: IMPLANTABLE DEVICE | Site: TIBIA | Status: FUNCTIONAL
Brand: T2 ALPHA

## 2025-05-10 DEVICE — END CAP TIBIA
Type: IMPLANTABLE DEVICE | Site: TIBIA | Status: FUNCTIONAL
Brand: T2 ALPHA

## 2025-05-10 DEVICE — K-WIRE: Type: IMPLANTABLE DEVICE | Site: TIBIA | Status: FUNCTIONAL

## 2025-05-10 RX ORDER — ONDANSETRON 4 MG/1
4 TABLET, ORALLY DISINTEGRATING ORAL EVERY 6 HOURS PRN
Status: DISCONTINUED | OUTPATIENT
Start: 2025-05-10 | End: 2025-05-12 | Stop reason: HOSPADM

## 2025-05-10 RX ORDER — HYDROMORPHONE HCL IN WATER/PF 6 MG/30 ML
0.2 PATIENT CONTROLLED ANALGESIA SYRINGE INTRAVENOUS
Refills: 0 | Status: DISCONTINUED | OUTPATIENT
Start: 2025-05-10 | End: 2025-05-12 | Stop reason: HOSPADM

## 2025-05-10 RX ORDER — ACETAMINOPHEN 325 MG/1
975 TABLET ORAL EVERY 8 HOURS
Status: DISCONTINUED | OUTPATIENT
Start: 2025-05-10 | End: 2025-05-12 | Stop reason: HOSPADM

## 2025-05-10 RX ORDER — CEFAZOLIN SODIUM/WATER 2 G/20 ML
2 SYRINGE (ML) INTRAVENOUS SEE ADMIN INSTRUCTIONS
Status: DISCONTINUED | OUTPATIENT
Start: 2025-05-10 | End: 2025-05-10 | Stop reason: HOSPADM

## 2025-05-10 RX ORDER — NALOXONE HYDROCHLORIDE 0.4 MG/ML
0.2 INJECTION, SOLUTION INTRAMUSCULAR; INTRAVENOUS; SUBCUTANEOUS
Status: DISCONTINUED | OUTPATIENT
Start: 2025-05-10 | End: 2025-05-12 | Stop reason: HOSPADM

## 2025-05-10 RX ORDER — LIDOCAINE 40 MG/G
CREAM TOPICAL
Status: DISCONTINUED | OUTPATIENT
Start: 2025-05-10 | End: 2025-05-12 | Stop reason: HOSPADM

## 2025-05-10 RX ORDER — FENTANYL CITRATE 50 UG/ML
50 INJECTION, SOLUTION INTRAMUSCULAR; INTRAVENOUS EVERY 5 MIN PRN
Status: DISCONTINUED | OUTPATIENT
Start: 2025-05-10 | End: 2025-05-10 | Stop reason: HOSPADM

## 2025-05-10 RX ORDER — DEXAMETHASONE SODIUM PHOSPHATE 4 MG/ML
4 INJECTION, SOLUTION INTRA-ARTICULAR; INTRALESIONAL; INTRAMUSCULAR; INTRAVENOUS; SOFT TISSUE
Status: DISCONTINUED | OUTPATIENT
Start: 2025-05-10 | End: 2025-05-10 | Stop reason: HOSPADM

## 2025-05-10 RX ORDER — ONDANSETRON 4 MG/1
4 TABLET, ORALLY DISINTEGRATING ORAL EVERY 30 MIN PRN
Status: DISCONTINUED | OUTPATIENT
Start: 2025-05-10 | End: 2025-05-10 | Stop reason: HOSPADM

## 2025-05-10 RX ORDER — PROCHLORPERAZINE MALEATE 10 MG
10 TABLET ORAL EVERY 6 HOURS PRN
Status: DISCONTINUED | OUTPATIENT
Start: 2025-05-10 | End: 2025-05-10

## 2025-05-10 RX ORDER — ACETAMINOPHEN 325 MG/1
975 TABLET ORAL 3 TIMES DAILY
Status: DISCONTINUED | OUTPATIENT
Start: 2025-05-10 | End: 2025-05-10

## 2025-05-10 RX ORDER — PROPOFOL 10 MG/ML
INJECTION, EMULSION INTRAVENOUS CONTINUOUS PRN
Status: DISCONTINUED | OUTPATIENT
Start: 2025-05-10 | End: 2025-05-10

## 2025-05-10 RX ORDER — KETOROLAC TROMETHAMINE 30 MG/ML
INJECTION, SOLUTION INTRAMUSCULAR; INTRAVENOUS PRN
Status: DISCONTINUED | OUTPATIENT
Start: 2025-05-10 | End: 2025-05-10

## 2025-05-10 RX ORDER — POLYETHYLENE GLYCOL 3350 17 G/17G
17 POWDER, FOR SOLUTION ORAL DAILY
Status: DISCONTINUED | OUTPATIENT
Start: 2025-05-11 | End: 2025-05-12 | Stop reason: HOSPADM

## 2025-05-10 RX ORDER — HYDROMORPHONE HCL IN WATER/PF 6 MG/30 ML
0.4 PATIENT CONTROLLED ANALGESIA SYRINGE INTRAVENOUS
Refills: 0 | Status: DISCONTINUED | OUTPATIENT
Start: 2025-05-10 | End: 2025-05-12 | Stop reason: HOSPADM

## 2025-05-10 RX ORDER — TRANEXAMIC ACID 650 MG/1
1950 TABLET ORAL ONCE
Status: COMPLETED | OUTPATIENT
Start: 2025-05-10 | End: 2025-05-10

## 2025-05-10 RX ORDER — ASPIRIN 81 MG/1
81 TABLET ORAL 2 TIMES DAILY
Status: DISCONTINUED | OUTPATIENT
Start: 2025-05-10 | End: 2025-05-12 | Stop reason: HOSPADM

## 2025-05-10 RX ORDER — PROCHLORPERAZINE MALEATE 10 MG
10 TABLET ORAL EVERY 6 HOURS PRN
Status: DISCONTINUED | OUTPATIENT
Start: 2025-05-10 | End: 2025-05-12 | Stop reason: HOSPADM

## 2025-05-10 RX ORDER — ONDANSETRON 2 MG/ML
4 INJECTION INTRAMUSCULAR; INTRAVENOUS EVERY 30 MIN PRN
Status: DISCONTINUED | OUTPATIENT
Start: 2025-05-10 | End: 2025-05-10 | Stop reason: HOSPADM

## 2025-05-10 RX ORDER — FENTANYL CITRATE 50 UG/ML
INJECTION, SOLUTION INTRAMUSCULAR; INTRAVENOUS PRN
Status: DISCONTINUED | OUTPATIENT
Start: 2025-05-10 | End: 2025-05-10

## 2025-05-10 RX ORDER — OXYCODONE HYDROCHLORIDE 5 MG/1
5 TABLET ORAL EVERY 4 HOURS PRN
Status: DISCONTINUED | OUTPATIENT
Start: 2025-05-10 | End: 2025-05-11

## 2025-05-10 RX ORDER — PROPOFOL 10 MG/ML
INJECTION, EMULSION INTRAVENOUS PRN
Status: DISCONTINUED | OUTPATIENT
Start: 2025-05-10 | End: 2025-05-10

## 2025-05-10 RX ORDER — ONDANSETRON 2 MG/ML
4 INJECTION INTRAMUSCULAR; INTRAVENOUS EVERY 6 HOURS PRN
Status: DISCONTINUED | OUTPATIENT
Start: 2025-05-10 | End: 2025-05-12 | Stop reason: HOSPADM

## 2025-05-10 RX ORDER — BISACODYL 10 MG
10 SUPPOSITORY, RECTAL RECTAL DAILY PRN
Status: DISCONTINUED | OUTPATIENT
Start: 2025-05-10 | End: 2025-05-12 | Stop reason: HOSPADM

## 2025-05-10 RX ORDER — SODIUM CHLORIDE, SODIUM LACTATE, POTASSIUM CHLORIDE, CALCIUM CHLORIDE 600; 310; 30; 20 MG/100ML; MG/100ML; MG/100ML; MG/100ML
INJECTION, SOLUTION INTRAVENOUS CONTINUOUS
Status: DISCONTINUED | OUTPATIENT
Start: 2025-05-10 | End: 2025-05-10 | Stop reason: HOSPADM

## 2025-05-10 RX ORDER — MORPHINE SULFATE 4 MG/ML
4 INJECTION, SOLUTION INTRAMUSCULAR; INTRAVENOUS ONCE
Refills: 0 | Status: COMPLETED | OUTPATIENT
Start: 2025-05-10 | End: 2025-05-10

## 2025-05-10 RX ORDER — CEFAZOLIN SODIUM 2 G/50ML
2 SOLUTION INTRAVENOUS EVERY 8 HOURS
Status: COMPLETED | OUTPATIENT
Start: 2025-05-11 | End: 2025-05-11

## 2025-05-10 RX ORDER — AMOXICILLIN 250 MG
1 CAPSULE ORAL 2 TIMES DAILY
Status: DISCONTINUED | OUTPATIENT
Start: 2025-05-10 | End: 2025-05-12 | Stop reason: HOSPADM

## 2025-05-10 RX ORDER — LABETALOL HYDROCHLORIDE 5 MG/ML
10 INJECTION, SOLUTION INTRAVENOUS
Status: DISCONTINUED | OUTPATIENT
Start: 2025-05-10 | End: 2025-05-10 | Stop reason: HOSPADM

## 2025-05-10 RX ORDER — POLYETHYLENE GLYCOL 3350 17 G/17G
17 POWDER, FOR SOLUTION ORAL 2 TIMES DAILY PRN
Status: DISCONTINUED | OUTPATIENT
Start: 2025-05-10 | End: 2025-05-12 | Stop reason: HOSPADM

## 2025-05-10 RX ORDER — OXYCODONE HYDROCHLORIDE 5 MG/1
10 TABLET ORAL EVERY 4 HOURS PRN
Status: DISCONTINUED | OUTPATIENT
Start: 2025-05-10 | End: 2025-05-11

## 2025-05-10 RX ORDER — NALOXONE HYDROCHLORIDE 0.4 MG/ML
0.4 INJECTION, SOLUTION INTRAMUSCULAR; INTRAVENOUS; SUBCUTANEOUS
Status: DISCONTINUED | OUTPATIENT
Start: 2025-05-10 | End: 2025-05-12 | Stop reason: HOSPADM

## 2025-05-10 RX ORDER — DIPHENHYDRAMINE HYDROCHLORIDE 50 MG/ML
25 INJECTION, SOLUTION INTRAMUSCULAR; INTRAVENOUS EVERY 6 HOURS PRN
Status: DISCONTINUED | OUTPATIENT
Start: 2025-05-10 | End: 2025-05-10 | Stop reason: HOSPADM

## 2025-05-10 RX ORDER — OXYCODONE HYDROCHLORIDE 5 MG/1
5 TABLET ORAL EVERY 4 HOURS PRN
Refills: 0 | Status: DISCONTINUED | OUTPATIENT
Start: 2025-05-10 | End: 2025-05-10

## 2025-05-10 RX ORDER — AMOXICILLIN 250 MG
2 CAPSULE ORAL 2 TIMES DAILY PRN
Status: DISCONTINUED | OUTPATIENT
Start: 2025-05-10 | End: 2025-05-12 | Stop reason: HOSPADM

## 2025-05-10 RX ORDER — HYDROMORPHONE HYDROCHLORIDE 1 MG/ML
0.5 INJECTION, SOLUTION INTRAMUSCULAR; INTRAVENOUS; SUBCUTANEOUS ONCE
Refills: 0 | Status: COMPLETED | OUTPATIENT
Start: 2025-05-10 | End: 2025-05-10

## 2025-05-10 RX ORDER — FENTANYL CITRATE 50 UG/ML
25 INJECTION, SOLUTION INTRAMUSCULAR; INTRAVENOUS EVERY 5 MIN PRN
Status: DISCONTINUED | OUTPATIENT
Start: 2025-05-10 | End: 2025-05-10 | Stop reason: HOSPADM

## 2025-05-10 RX ORDER — ONDANSETRON 2 MG/ML
4 INJECTION INTRAMUSCULAR; INTRAVENOUS EVERY 6 HOURS PRN
Status: DISCONTINUED | OUTPATIENT
Start: 2025-05-10 | End: 2025-05-10

## 2025-05-10 RX ORDER — NALOXONE HYDROCHLORIDE 1 MG/ML
0.1 INJECTION INTRAMUSCULAR; INTRAVENOUS; SUBCUTANEOUS
Status: DISCONTINUED | OUTPATIENT
Start: 2025-05-10 | End: 2025-05-10 | Stop reason: HOSPADM

## 2025-05-10 RX ORDER — ONDANSETRON 2 MG/ML
INJECTION INTRAMUSCULAR; INTRAVENOUS PRN
Status: DISCONTINUED | OUTPATIENT
Start: 2025-05-10 | End: 2025-05-10

## 2025-05-10 RX ORDER — HYDROXYZINE HYDROCHLORIDE 25 MG/1
25 TABLET, FILM COATED ORAL EVERY 6 HOURS PRN
Status: DISCONTINUED | OUTPATIENT
Start: 2025-05-10 | End: 2025-05-12 | Stop reason: HOSPADM

## 2025-05-10 RX ORDER — SODIUM CHLORIDE, SODIUM LACTATE, POTASSIUM CHLORIDE, CALCIUM CHLORIDE 600; 310; 30; 20 MG/100ML; MG/100ML; MG/100ML; MG/100ML
INJECTION, SOLUTION INTRAVENOUS CONTINUOUS PRN
Status: DISCONTINUED | OUTPATIENT
Start: 2025-05-10 | End: 2025-05-10

## 2025-05-10 RX ORDER — MEPERIDINE HYDROCHLORIDE 25 MG/ML
12.5 INJECTION INTRAMUSCULAR; INTRAVENOUS; SUBCUTANEOUS EVERY 5 MIN PRN
Status: DISCONTINUED | OUTPATIENT
Start: 2025-05-10 | End: 2025-05-10 | Stop reason: HOSPADM

## 2025-05-10 RX ORDER — CALCIUM CARBONATE 500 MG/1
1000 TABLET, CHEWABLE ORAL 4 TIMES DAILY PRN
Status: DISCONTINUED | OUTPATIENT
Start: 2025-05-10 | End: 2025-05-12 | Stop reason: HOSPADM

## 2025-05-10 RX ORDER — HYDROMORPHONE HCL IN WATER/PF 6 MG/30 ML
0.4 PATIENT CONTROLLED ANALGESIA SYRINGE INTRAVENOUS
Status: DISCONTINUED | OUTPATIENT
Start: 2025-05-10 | End: 2025-05-10

## 2025-05-10 RX ORDER — MAGNESIUM HYDROXIDE 1200 MG/15ML
LIQUID ORAL PRN
Status: DISCONTINUED | OUTPATIENT
Start: 2025-05-10 | End: 2025-05-10 | Stop reason: HOSPADM

## 2025-05-10 RX ORDER — DIPHENHYDRAMINE HCL 25 MG
25 CAPSULE ORAL EVERY 6 HOURS PRN
Status: DISCONTINUED | OUTPATIENT
Start: 2025-05-10 | End: 2025-05-10 | Stop reason: HOSPADM

## 2025-05-10 RX ORDER — ONDANSETRON 4 MG/1
4 TABLET, ORALLY DISINTEGRATING ORAL EVERY 6 HOURS PRN
Status: DISCONTINUED | OUTPATIENT
Start: 2025-05-10 | End: 2025-05-10

## 2025-05-10 RX ORDER — CEFAZOLIN SODIUM/WATER 2 G/20 ML
2 SYRINGE (ML) INTRAVENOUS
Status: DISCONTINUED | OUTPATIENT
Start: 2025-05-10 | End: 2025-05-10 | Stop reason: HOSPADM

## 2025-05-10 RX ORDER — SODIUM CHLORIDE, SODIUM LACTATE, POTASSIUM CHLORIDE, CALCIUM CHLORIDE 600; 310; 30; 20 MG/100ML; MG/100ML; MG/100ML; MG/100ML
INJECTION, SOLUTION INTRAVENOUS CONTINUOUS
Status: DISCONTINUED | OUTPATIENT
Start: 2025-05-10 | End: 2025-05-11

## 2025-05-10 RX ORDER — KETOROLAC TROMETHAMINE 15 MG/ML
15 INJECTION, SOLUTION INTRAMUSCULAR; INTRAVENOUS EVERY 6 HOURS
Status: COMPLETED | OUTPATIENT
Start: 2025-05-11 | End: 2025-05-11

## 2025-05-10 RX ORDER — AMOXICILLIN 250 MG
1 CAPSULE ORAL 2 TIMES DAILY PRN
Status: DISCONTINUED | OUTPATIENT
Start: 2025-05-10 | End: 2025-05-12 | Stop reason: HOSPADM

## 2025-05-10 RX ORDER — LIDOCAINE HYDROCHLORIDE 10 MG/ML
INJECTION, SOLUTION INFILTRATION; PERINEURAL PRN
Status: DISCONTINUED | OUTPATIENT
Start: 2025-05-10 | End: 2025-05-10

## 2025-05-10 RX ORDER — HYDROMORPHONE HCL IN WATER/PF 6 MG/30 ML
0.2 PATIENT CONTROLLED ANALGESIA SYRINGE INTRAVENOUS
Status: DISCONTINUED | OUTPATIENT
Start: 2025-05-10 | End: 2025-05-10

## 2025-05-10 RX ORDER — DEXAMETHASONE SODIUM PHOSPHATE 4 MG/ML
INJECTION, SOLUTION INTRA-ARTICULAR; INTRALESIONAL; INTRAMUSCULAR; INTRAVENOUS; SOFT TISSUE PRN
Status: DISCONTINUED | OUTPATIENT
Start: 2025-05-10 | End: 2025-05-10

## 2025-05-10 RX ADMIN — PROPOFOL 50 MCG/KG/MIN: 10 INJECTION, EMULSION INTRAVENOUS at 17:26

## 2025-05-10 RX ADMIN — HYDROMORPHONE HYDROCHLORIDE 0.5 MG: 1 INJECTION, SOLUTION INTRAMUSCULAR; INTRAVENOUS; SUBCUTANEOUS at 15:04

## 2025-05-10 RX ADMIN — PROPOFOL 150 MG: 10 INJECTION, EMULSION INTRAVENOUS at 17:08

## 2025-05-10 RX ADMIN — SUGAMMADEX 200 MG: 100 INJECTION, SOLUTION INTRAVENOUS at 19:55

## 2025-05-10 RX ADMIN — OXYCODONE HYDROCHLORIDE 10 MG: 5 TABLET ORAL at 22:25

## 2025-05-10 RX ADMIN — ASPIRIN 81 MG: 81 TABLET, COATED ORAL at 22:25

## 2025-05-10 RX ADMIN — ROCURONIUM 30 MG: 50 INJECTION, SOLUTION INTRAVENOUS at 18:00

## 2025-05-10 RX ADMIN — LIDOCAINE HYDROCHLORIDE 5 ML: 10 INJECTION, SOLUTION INFILTRATION; PERINEURAL at 17:08

## 2025-05-10 RX ADMIN — HYDROMORPHONE HYDROCHLORIDE 0.4 MG: 0.2 INJECTION, SOLUTION INTRAMUSCULAR; INTRAVENOUS; SUBCUTANEOUS at 21:20

## 2025-05-10 RX ADMIN — ACETAMINOPHEN 975 MG: 325 TABLET ORAL at 15:03

## 2025-05-10 RX ADMIN — SODIUM CHLORIDE, SODIUM LACTATE, POTASSIUM CHLORIDE, AND CALCIUM CHLORIDE: .6; .31; .03; .02 INJECTION, SOLUTION INTRAVENOUS at 17:06

## 2025-05-10 RX ADMIN — SODIUM CHLORIDE, SODIUM LACTATE, POTASSIUM CHLORIDE, AND CALCIUM CHLORIDE: .6; .31; .03; .02 INJECTION, SOLUTION INTRAVENOUS at 19:23

## 2025-05-10 RX ADMIN — DEXAMETHASONE SODIUM PHOSPHATE 10 MG: 4 INJECTION, SOLUTION INTRA-ARTICULAR; INTRALESIONAL; INTRAMUSCULAR; INTRAVENOUS; SOFT TISSUE at 17:20

## 2025-05-10 RX ADMIN — ROCURONIUM 20 MG: 50 INJECTION, SOLUTION INTRAVENOUS at 18:28

## 2025-05-10 RX ADMIN — KETOROLAC TROMETHAMINE 30 MG: 30 INJECTION, SOLUTION INTRAMUSCULAR at 19:26

## 2025-05-10 RX ADMIN — HYDROMORPHONE HYDROCHLORIDE 0.5 MG: 1 INJECTION, SOLUTION INTRAMUSCULAR; INTRAVENOUS; SUBCUTANEOUS at 18:00

## 2025-05-10 RX ADMIN — MORPHINE SULFATE 4 MG: 4 INJECTION, SOLUTION INTRAMUSCULAR; INTRAVENOUS at 12:22

## 2025-05-10 RX ADMIN — SENNOSIDES AND DOCUSATE SODIUM 1 TABLET: 50; 8.6 TABLET ORAL at 22:25

## 2025-05-10 RX ADMIN — HYDROMORPHONE HYDROCHLORIDE 0.5 MG: 1 INJECTION, SOLUTION INTRAMUSCULAR; INTRAVENOUS; SUBCUTANEOUS at 17:43

## 2025-05-10 RX ADMIN — HYDROMORPHONE HYDROCHLORIDE 0.5 MG: 1 INJECTION, SOLUTION INTRAMUSCULAR; INTRAVENOUS; SUBCUTANEOUS at 19:30

## 2025-05-10 RX ADMIN — ACETAMINOPHEN 975 MG: 325 TABLET ORAL at 22:25

## 2025-05-10 RX ADMIN — TRANEXAMIC ACID 1000 MG: 650 TABLET ORAL at 17:20

## 2025-05-10 RX ADMIN — HYDROMORPHONE HYDROCHLORIDE 0.5 MG: 1 INJECTION, SOLUTION INTRAMUSCULAR; INTRAVENOUS; SUBCUTANEOUS at 19:40

## 2025-05-10 RX ADMIN — SODIUM CHLORIDE, SODIUM LACTATE, POTASSIUM CHLORIDE, AND CALCIUM CHLORIDE: .6; .31; .03; .02 INJECTION, SOLUTION INTRAVENOUS at 22:28

## 2025-05-10 RX ADMIN — FENTANYL CITRATE 100 MCG: 50 INJECTION, SOLUTION INTRAMUSCULAR; INTRAVENOUS at 17:08

## 2025-05-10 RX ADMIN — MIDAZOLAM HYDROCHLORIDE 2 MG: 1 INJECTION, SOLUTION INTRAMUSCULAR; INTRAVENOUS at 17:04

## 2025-05-10 RX ADMIN — MORPHINE SULFATE 4 MG: 4 INJECTION, SOLUTION INTRAMUSCULAR; INTRAVENOUS at 13:07

## 2025-05-10 RX ADMIN — PROPOFOL 50 MG: 10 INJECTION, EMULSION INTRAVENOUS at 17:13

## 2025-05-10 RX ADMIN — Medication 2 G: at 17:08

## 2025-05-10 RX ADMIN — ONDANSETRON 4 MG: 2 INJECTION INTRAMUSCULAR; INTRAVENOUS at 17:20

## 2025-05-10 ASSESSMENT — COLUMBIA-SUICIDE SEVERITY RATING SCALE - C-SSRS
1. IN THE PAST MONTH, HAVE YOU WISHED YOU WERE DEAD OR WISHED YOU COULD GO TO SLEEP AND NOT WAKE UP?: NO
2. HAVE YOU ACTUALLY HAD ANY THOUGHTS OF KILLING YOURSELF IN THE PAST MONTH?: NO
6. HAVE YOU EVER DONE ANYTHING, STARTED TO DO ANYTHING, OR PREPARED TO DO ANYTHING TO END YOUR LIFE?: NO

## 2025-05-10 ASSESSMENT — ACTIVITIES OF DAILY LIVING (ADL)
ADLS_ACUITY_SCORE: 18
ADLS_ACUITY_SCORE: 41
ADLS_ACUITY_SCORE: 18
ADLS_ACUITY_SCORE: 15
ADLS_ACUITY_SCORE: 18
ADLS_ACUITY_SCORE: 41
ADLS_ACUITY_SCORE: 15
ADLS_ACUITY_SCORE: 41
ADLS_ACUITY_SCORE: 18

## 2025-05-10 NOTE — H&P
"[facility]    History and Physical - Hospitalist Service       Date of Admission:  5/10/2025    Assessment & Plan    Glenda Chang is a 45 year old female who was admitted on 5/10/2025. She was seen at [facility] outpatient after a fall at home, found to have distal tibial shaft and fibular shaft fracture. Orthopedics consulted ; planning on surgical fixation later this evening     Distal Left Tib-Fib Fracture   Left Lateral Malleoli Avulsion Fracture   -- CT showing mildly displaced distal tibia shaft fracture with nondisplaced oblique/spiral extension distally into the tibiotalar joint by means of the tibial plafond at base of the posterior malleolus. Mildly displaced oblique fracture proximal fibular shaft and small avulsion fracture of lateral malleolus   -- In splint from [facility] clinic  -- CMS checks q 1 hour until surgery this evening - CK normal limits   -- Pain control with scheduled Tylenol, PRN oxycodone/Dilaudid    -- NPO   -- Ortho surgery consult ; plans to OR this evening  -- PT/OT   Preoperative Clearance: Patient low risk to undergo urgent surgical intervention at this time - EKG reviewed     Fall at Home   Had been drinking, fell last evening while making her bed. States she was unable to get up and so stayed on the floor for several hours. She denies any head injury, does not believe she lost consciousness but states she does not \"fully remember\" how or why she fell   -- Declining CT Head and C spine   -- Normal CK   -- Will need PT/OT eval postoperatively     Alcohol Use   States she had a full bottle of wine prior to fall. She states she drinks about 2 days per week and will have ~5 drinks each time. She denies any history of alcohol dependence or withdrawal   -- Last EtOH intake 5/9 PM   -- CIWA scores     Leukocytosis   Suspect reactive although slight increase in neutrophils   -- Trend CBC, fever curve     Elevated BP without the Diagnosis of Hypertension   Likely due " to acute pain   -- Focus on pain control   -- Monitor     IUD in place           Diet: NPO for Procedure/Surgery per Anesthesia Guidelines Except for: Meds; Clear liquids before procedure/surgery: ADULT (Age GREATER than or Equal to 18 years) - Clear liquids 2 hours before procedure/surgery  DVT Prophylaxis: VTE Prophylaxis contraindicated due to plans for surgical intervention, trauma to lower extremity   Clement Catheter: Not present  Lines: None     Cardiac Monitoring: None  Code Status: Full Code - discussed and confirmed with patient     Clinically Significant Risk Factors Present on Admission          # Hyperchloremia: Highest Cl = 108 mmol/L in last 2 days, will monitor as appropriate              # Hypertension: Noted on problem list                      Disposition Plan     Medically Ready for Discharge: Anticipated in 2-4 Days        The patient's care was discussed with the Attending Physician, Dr. Saad Gondalwho independently met with and assessed the patient and is in agreement with the assessment and plan     Daly Teixeira PA-C  Hospitalist Service  [facility]  Securely message with [employer] (more info)  Text page via [employer] Paging/Directory     ______________________________________________________________________    Chief Complaint   Fall at home  Left lower extremity pain    History is obtained from the patient    History of Present Illness   Glenda Chang is a 45 year old female who presented to the ER on 5/10/2025 for left lower extremity pain.  She states she had drinking a full bottle of wine last night and while she was making her bed she lost her balance and fell.  She denies any head injury or loss of consciousness but does admit she does not fully remember what happened.  States she was unable to get up or ambulate after the fall and so stayed on the ground for several hours over the course of the evening.  She had tried taking some Advil for pain control and went to the  [facility] who instructed her to present to the ER for distal tib-fib fracture.  She states pain is currently improved, rating it as 5/10.    She states she drinks alcohol 2 days/week and admits to 5 drinks each day that she drinks.  She denies any history of alcohol dependence or withdrawal.  Last alcoholic drink was prior to her fall last night.     Past Medical History    No past medical history on file.    Past Surgical History   No past surgical history on file.    Prior to Admission Medications   Prior to Admission Medications   Prescriptions Last Dose Informant Patient Reported? Taking?   levonorgestrel (MIRENA) 52 MG (20 mcg/day) IUD   Yes Yes   Sig: by Intrauterine route once.      Facility-Administered Medications: None           Physical Exam   Vital Signs: Temp: 98.4  F (36.9  C) Temp src: Oral BP: 135/71 Pulse: 83   Resp: 16 SpO2: 98 % O2 Device: None (Room air)    Weight: 165 lbs 0 oz    Constitutional: awake, alert, no apparent distress, and appears stated age  Respiratory: No increased work of breathing, no accessory muscle use, clear to auscultation bilaterally, no crackles or wheezing  Cardiovascular: Regular rate and rhythm, normal S1 and S2  Skin: Normal skin color, texture, turgor. No rashes and no jaundice  Musculoskeletal: LLE in splint.  Wiggles all toes appropriately.  Cap refill < 2 seconds.   Neurologic: Awake, alert.   Neuropsychiatric: Appropriate mood, affect and eye contact. Cooperative.    Medical Decision Making             Data     I have personally reviewed the following data over the past 24 hrs:    13.0 (H)  \   13.2   / 275     141 108 (H) 13.0 /  108 (H)   4.1 22 0.81 \       Imaging results reviewed over the past 24 hrs:   Recent Results (from the past 24 hours)   CT Tibia Fibula Lower Leg Left wo Contrast    Narrative    EXAM: CT TIBIA FIBULA LOWER LEG LEFT WO CONTRAST  LOCATION: [facility]  DATE: 5/10/2025    INDICATION: Eval ankle fx. Eval tib  fib fx  COMPARISON:  None available.  TECHNIQUE: Noncontrast. Axial, sagittal and coronal thin-section reconstruction. Dose reduction techniques were used.     FINDINGS:     BONES:  - Acute, mildly displaced oblique fracture involving the proximal fibular shaft with up to 4 mm displacement.    Acute, displaced oblique fracture involving the distal one third of the tibial shaft with up to 9 mm displacement. There is nondisplaced oblique/spiral extension of the fracture through the base of the posterior malleolus and into the central aspect of   the tibial plafond at the tibiotalar joint in keeping with intra-articular extension of the fracture.    There is a small avulsive fracture fragment along the anterior aspect of the lateral malleolus near the distal syndesmotic region, favor avulsive.    No additional fractures identified.    SOFT TISSUES:  -Soft tissue swelling and blood products about the mid to distal calf and ankle.      Impression    IMPRESSION:  1.  Mildly displaced distal tibia shaft fracture, with nondisplaced oblique/spiral extension distally into the tibiotalar joint by means of the tibial plafond at base of the posterior malleolus.    2.  Mildly displaced oblique fracture proximal fibular shaft.    3.  Small avulsion type fracture along the anterior aspect of the lateral malleolus near the distal syndesmotic region.    4.  Surrounding soft tissue swelling and poorly defined blood products.      NOTE: ABNORMAL REPORT    THE DICTATION ABOVE DESCRIBES AN ABNORMALITY FOR WHICH FOLLOW-UP IS NEEDED.    XR External Imaging Lower Extremity Left    Narrative    Images were obtained from an external facility.  Click PACS Images   hyperlink to view images.  Textual results have been scanned into the   media tab.

## 2025-05-10 NOTE — ANESTHESIA PROCEDURE NOTES
Airway       Patient location during procedure: OR       Procedure Start/Stop Times: 5/10/2025 5:10 PM  Staff -        CRNA: Jayce Gonzalez APRN CRNA       Performed By: CRNA  Consent for Airway        Urgency: elective  Indications and Patient Condition       Indications for airway management: ari-procedural         Mask difficulty assessment: 1 - vent by mask    Final Airway Details       Final airway type: endotracheal airway       Successful airway: ETT - single  Endotracheal Airway Details        ETT size (mm): 6.5       Successful intubation technique: direct laryngoscopy       DL Blade Type: MAC 3       Grade View of Cords: 1       Adjucts: stylet       Position: Right       Measured from: gums/teeth       Secured at (cm): 21       Bite block used: None    Post intubation assessment        Placement verified by: capnometry, equal breath sounds and chest rise        Number of attempts at approach: 1       Number of other approaches attempted: 0       Secured with: tape       Ease of procedure: easy       Dentition: Unchanged and Intact    Medication(s) Administered   Medication Administration Time: 5/10/2025 5:10 PM

## 2025-05-10 NOTE — ANESTHESIA PREPROCEDURE EVALUATION
"Anesthesia Pre-Procedure Evaluation    Patient: Glenda Chang   MRN: [id number] : 1980          Procedure : Procedure(s):  OPEN REDUCTION INTERNAL FIXATION, FRACTURE, TIBIA, USING INTRAMEDULLARY GWENDOLYN         History reviewed. No pertinent past medical history.   History reviewed. No pertinent surgical history.   Allergies   Allergen Reactions    Dramamine Ii [Meclizine] Unknown     Seizure      Social History     Tobacco Use    Smoking status: Former    Smokeless tobacco: Never   Substance Use Topics    Alcohol use: No      Wt Readings from Last 1 Encounters:   05/10/25 74.8 kg (165 lb)        Anesthesia Evaluation   Pt has not had prior anesthetic         ROS/MED HX  ENT/Pulmonary:       Neurologic:       Cardiovascular:       METS/Exercise Tolerance:     Hematologic:       Musculoskeletal:       GI/Hepatic:       Renal/Genitourinary:       Endo:       Psychiatric/Substance Use:       Infectious Disease:       Malignancy:       Other:              Physical Exam  Airway  Mallampati: I  TM distance: <3 FB  Mouth opening: >= 4 cm    Cardiovascular    Dental   (+) Completely normal teeth      Pulmonary       Neurological   Other Findings       OUTSIDE LABS:  CBC:   Lab Results   Component Value Date    WBC 13.0 (H) 05/10/2025    WBC 8.6 2024    HGB 13.2 05/10/2025    HGB 13.3 2024    HCT 40.1 05/10/2025    HCT 40.1 2024     05/10/2025     2024     BMP:   Lab Results   Component Value Date     05/10/2025     2024    POTASSIUM 4.1 05/10/2025    POTASSIUM 4.1 2024    CHLORIDE 108 (H) 05/10/2025    CHLORIDE 106 2024    CO2 22 05/10/2025    CO2 24 2024    BUN 13.0 05/10/2025    BUN 11.2 2024    CR 0.81 05/10/2025    CR 0.84 2024     (H) 05/10/2025    GLC 99 2024     COAGS: No results found for: \"PTT\", \"INR\", \"FIBR\"  POC: No results found for: \"BGM\", \"HCG\", \"HCGS\"  HEPATIC:   Lab Results   Component Value Date    " ALBUMIN 4.5 04/02/2024    PROTTOTAL 6.5 04/02/2024    ALT 28 04/02/2024    AST 21 04/02/2024    ALKPHOS 66 04/02/2024    BILITOTAL 0.7 04/02/2024     OTHER:   Lab Results   Component Value Date    CARYL 9.0 05/10/2025    TSH 2.59 04/02/2024    SED 3 04/02/2024       Anesthesia Plan    ASA Status:  2      NPO Status: NPO Appropriate   Anesthesia Type: General.   Induction: intravenous.        Consents    Anesthesia Plan(s) and associated risks, benefits, and realistic alternatives discussed. Questions answered and patient/representative(s) expressed understanding.     - Discussed:     - Discussed with:  Patient, family            Postoperative Care            Comments:                   Ismael Valencia MD    I have reviewed the pertinent notes and labs in the chart from the past 30 days and (re)examined the patient.  Any updates or changes from those notes are reflected in this note.    Clinically Significant Risk Factors Present on Admission          # Hyperchloremia: Highest Cl = 108 mmol/L in last 2 days, will monitor as appropriate              # Hypertension: Noted on problem list

## 2025-05-10 NOTE — PHARMACY-ADMISSION MEDICATION HISTORY
Pharmacy Intern Admission Medication History    Admission medication history is complete. The information provided in this note is only as accurate as the sources available at the time of the update.    Information Source(s): Patient via in-person    Pertinent Information:     Changes made to PTA medication list:  Added: IUD  Deleted: None  Changed: None    Allergies reviewed with patient and updates made in EHR: yes    Medication History Completed By: HUMBERTO ELLINGTON 5/10/2025 2:17 PM    PTA Med List   Medication Sig Last Dose/Taking    levonorgestrel (MIRENA) 52 MG (20 mcg/day) IUD by Intrauterine route once. Taking

## 2025-05-10 NOTE — ED TRIAGE NOTES
[facility]fib [facility]. Pt fell last night resulting in the fracture. Pain is 4/10 at the moment. Last dose of analgesics 600 ibuprofen at 08:00     Triage Assessment (Adult)       Row Name 05/10/25 1049          Triage Assessment    Airway WDL WDL        Respiratory WDL    Respiratory WDL WDL        Peripheral/Neurovascular WDL    Peripheral Neurovascular WDL WDL

## 2025-05-10 NOTE — ED NOTES
Expected Patient Referral to ED  10:21 AM    Referring Clinic/Provider:  [facility]  Dr. Santillan   Reason for referral/Clinical facts:  Fracture distal tib and fib last night  Admit for surgery  Recommendations provided:  Send to ED for further evaluation    Caller was informed that this institution does possess the capabilities and/or resources to provide for patient and should be transferred to our facility.    Discussed that if direct admit is sought and any hurdles are encountered, this ED would be happy to see the patient and evaluate.    Informed caller that recommendations provided are recommendations based only on the facts provided and that they responsible to accept or reject the advice, or to seek a formal in person consultation as needed and that this ED will see/treat patient should they arrive.      Orly Colon MD  [facility] EMERGENCY DEPARTMENT  [address] [place] [id number] [phone]       Orly Colon MD  05/10/25 1023    
How Severe Are They?: mild
[facility] ED Handoff Report    ED Chief Complaint: left leg pain s/p fall last night    ED Diagnosis:  (S82.202A,  S82.402A) Tibia/fibula fracture, left, closed, initial encounter  (primary encounter diagnosis)  Comment: OR @1820  Plan: Case Request: OPEN REDUCTION INTERNAL FIXATION,        FRACTURE, TIBIA, USING INTRAMEDULLARY GWENDOLYN            (S82.102A) Closed fracture of proximal end of left tibia, unspecified fracture morphology, initial encounter      PMH:  No past medical history on file.     Code Status:  No Order     Falls Risk: Yes Band: Applied    Current Living Situation/Residence: lives with a significant other     Elimination Status: Continent: Yes     Activity Level: Total assist/lift    Patients Preferred Language:  English     Needed: No    Vital Signs:  BP (!) 146/72   Pulse 72   Temp 99  F (37.2  C) (Oral)   Resp 16   Wt 74.8 kg (165 lb)   SpO2 100%   BMI 29.23 kg/m       Pain Score: 9/10- provider notified of reassessment.     Is the Patient Confused:  No    Last Food or Drink: 5/9/25 at 2100    Focused Assessment:  left lower leg in splint, able to wiggle toes, PP present.     Tests Performed: Done: Labs and Imaging    Treatments Provided:  see mar    Family Dynamics/Concerns: No    Family Updated On Visitor Policy: Yes    Plan of Care Communicated to Family: Yes    Who Was Updated about Plan of Care:     Covid: asymptomatic , not tested    RN: Muna Eckert RN 5/10/2025 2:19 PM      
Is This A New Presentation, Or A Follow-Up?: Skin Lesions

## 2025-05-10 NOTE — CONSULTS
ORTHOPEDIC CONSULTATION    Consultation  Glenda Chang,  1980, MRN [id number]    No admission diagnoses are documented for this encounter.    PCP: Nain Nguyen, [phone]   Code status:  No Order       Extended Emergency Contact Information  Primary Emergency Contact: Jerod Chang   [place]  Mobile Phone: [phone]  Relation: Spouse  Secondary Emergency Contact: Sri Sims   [facility]  Home Phone: [phone]  Mobile Phone: [phone]  Relation: Other         IMPRESSION:  45-year-old female with left distal third tibial shaft and left proximal third fibular shaft fracture with a nondisplaced posterior malleolus fracture after a ground-level fall.    I had a long discussion with the patient and her  today about her injury.  We reviewed the diagnosis and available treatment options in detail.  We discussed both nonsurgical management in the form of a reduction and long-leg cast.  Additionally, we discussed surgical intervention in the form of open reduction internal fixation with a tibial intramedullary device.  Additionally, we discussed percutaneous fixation of the posterior malleolus fracture with cannulated screws.  We reviewed options in detail and the pros and cons of either approach.  After thorough discussion of the options utilizing shared decision making the patient and her  elected proceed with surgical intervention.    We reviewed the risks of surgery in detail, including, but not limited to anesthetic risk or medical risks including blood clot, stroke, death, surgical risks including, not limited to compartment syndrome, fracture malunion, fracture nonunion, persistent pain in either the knee, leg, or ankle, need for reoperation for any reason including infection, wound healing problems, nonunion, malunion, or other.    The patient and her  elected to proceed.  We will continue to monitor her preoperatively and plan for surgical intervention this  evening.     PLAN:  - Plan for surgical intervention and left tibia IMN this evening  - N.p.o.  - Maintain splint to left lower extremity  - Elevate left lower extremity above the level of heart  - If necessitating significant increase in pain medications or developing any neurovascular change, please contact orthopedic surgery immediately for repeat evaluation prior to surgery    Thank you for including [facility] in the care of Glenda Chang. It has been a pleasure participating in her care.      CHIEF COMPLAINT: Left tibia fracture    HISTORY OF PRESENT ILLNESS:  Glenda Chang is a 45-year-old female who had a slip and fall overnight and sustained a left tibia fracture.  She also has a proximal fibula fracture and a nondisplaced posterior malleolus fracture.  She reports that she has never had surgery on the left lower extremity.  She denies any antecedent leg, knee, or ankle pain.    She works as an .  Is mostly seated desk job.  She lives in [place], [place].    She denies any numbness or tingling in her left lower extremity currently.  Reports that pain is currently controlled on pain medications.      ALLERGIES:   Review of patient's allergies indicates   Allergies   Allergen Reactions    Dramamine Ii [Meclizine] Unknown     Seizure         MEDICATIONS UPON ADMISSION:  Medications were reviewed.  They include:   (Not in a hospital admission)        SOCIAL HISTORY:   she  reports that she has quit smoking. She has never used smokeless tobacco. She reports that she does not drink alcohol and does not use drugs.      FAMILY HISTORY:  family history includes Alcoholism in her father; Cerebrovascular Disease in her father; Depression in her mother; Diabetes in her father; Hypertension in her father.      REVIEW OF SYSTEMS:   Reviewed with patient. See HPI, otherwise negative       PHYSICAL EXAMINATION:  Vitals: Temp:  [99  F (37.2  C)] 99  F (37.2  C)  Pulse:  [74-89] 80  Resp:   [16] 16  BP: (117-154)/(75-91) 154/82  SpO2:  [100 %] 100 %  General: Resting in bed.  No acute distress.  Respiratory: Nonlabored breathing on room air  Vascular: Skin warm and well-perfused, palpable DP pulse in the left  Left lower extremity: Well-padded short leg splint in place.  The exposed toes do not have any pain with passive stretch in either direction.  She has sensation intact to light touch in the exposed dorsal and plantar foot.  She has a palpable DP pulse.  She has brisk capillary refill.  The palpable compartments through the splint to feel somewhat swollen, but soft.      RADIOGRAPHIC EVALUATION:  Personally reviewed.    AP and lateral left tib-fib x-rays from 5/10/2025 demonstrate a spiral oblique distal third tibial shaft fracture.    CT left tib-fib from 5/10/2025.  IMPRESSION:  1.  Mildly displaced distal tibia shaft fracture, with nondisplaced oblique/spiral extension distally into the tibiotalar joint by means of the tibial plafond at base of the posterior malleolus.     2.  Mildly displaced oblique fracture proximal fibular shaft.     3.  Small avulsion type fracture along the anterior aspect of the lateral malleolus near the distal syndesmotic region.     4.  Surrounding soft tissue swelling and poorly defined blood products.    PERTINENT LABS:  Lab Results: Personally reviewed  Lab Results   Component Value Date    WBC 13.0 05/10/2025    HGB 13.2 05/10/2025    HCT 40.1 05/10/2025     05/10/2025     05/10/2025         Jose Santillan MD  [facility]  Date: 5/10/2025  Time: 1:17 PM    CC1:   Orly Colon MD    CC2:   Nain Nguyen
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
## 2025-05-11 ENCOUNTER — APPOINTMENT (OUTPATIENT)
Dept: PHYSICAL THERAPY | Facility: HOSPITAL | Age: 45
End: 2025-05-11
Payer: COMMERCIAL

## 2025-05-11 ENCOUNTER — APPOINTMENT (OUTPATIENT)
Dept: OCCUPATIONAL THERAPY | Facility: HOSPITAL | Age: 45
End: 2025-05-11
Payer: COMMERCIAL

## 2025-05-11 LAB
ALBUMIN UR-MCNC: NEGATIVE MG/DL
ANION GAP SERPL CALCULATED.3IONS-SCNC: 9 MMOL/L (ref 7–15)
APPEARANCE UR: CLEAR
BILIRUB UR QL STRIP: NEGATIVE
BUN SERPL-MCNC: 14.4 MG/DL (ref 6–20)
CALCIUM SERPL-MCNC: 7.8 MG/DL (ref 8.8–10.4)
CHLORIDE SERPL-SCNC: 104 MMOL/L (ref 98–107)
COLOR UR AUTO: ABNORMAL
CREAT SERPL-MCNC: 0.73 MG/DL (ref 0.51–0.95)
EGFRCR SERPLBLD CKD-EPI 2021: >90 ML/MIN/1.73M2
ERYTHROCYTE [DISTWIDTH] IN BLOOD BY AUTOMATED COUNT: 11.8 % (ref 10–15)
GLUCOSE SERPL-MCNC: 218 MG/DL (ref 70–99)
GLUCOSE UR STRIP-MCNC: NEGATIVE MG/DL
HCO3 SERPL-SCNC: 23 MMOL/L (ref 22–29)
HCT VFR BLD AUTO: 33.1 % (ref 35–47)
HGB BLD-MCNC: 11.1 G/DL (ref 11.7–15.7)
HGB UR QL STRIP: NEGATIVE
KETONES UR STRIP-MCNC: NEGATIVE MG/DL
LEUKOCYTE ESTERASE UR QL STRIP: NEGATIVE
MAGNESIUM SERPL-MCNC: 1.8 MG/DL (ref 1.7–2.3)
MCH RBC QN AUTO: 33.9 PG (ref 26.5–33)
MCHC RBC AUTO-ENTMCNC: 33.5 G/DL (ref 31.5–36.5)
MCV RBC AUTO: 101 FL (ref 78–100)
MUCOUS THREADS #/AREA URNS LPF: PRESENT /LPF
NITRATE UR QL: NEGATIVE
PH UR STRIP: 6.5 [PH] (ref 5–7)
PLATELET # BLD AUTO: 234 10E3/UL (ref 150–450)
POTASSIUM SERPL-SCNC: 3.8 MMOL/L (ref 3.4–5.3)
RBC # BLD AUTO: 3.27 10E6/UL (ref 3.8–5.2)
RBC URINE: <1 /HPF
SODIUM SERPL-SCNC: 136 MMOL/L (ref 135–145)
SP GR UR STRIP: 1.02 (ref 1–1.03)
SQUAMOUS EPITHELIAL: 1 /HPF
UROBILINOGEN UR STRIP-MCNC: NORMAL MG/DL
WBC # BLD AUTO: 15.8 10E3/UL (ref 4–11)
WBC URINE: 1 /HPF

## 2025-05-11 PROCEDURE — 97535 SELF CARE MNGMENT TRAINING: CPT | Mod: GO

## 2025-05-11 PROCEDURE — 97162 PT EVAL MOD COMPLEX 30 MIN: CPT | Mod: GP | Performed by: PHYSICAL THERAPIST

## 2025-05-11 PROCEDURE — 81001 URINALYSIS AUTO W/SCOPE: CPT | Performed by: INTERNAL MEDICINE

## 2025-05-11 PROCEDURE — 97530 THERAPEUTIC ACTIVITIES: CPT | Mod: GP | Performed by: PHYSICAL THERAPIST

## 2025-05-11 PROCEDURE — 250N000013 HC RX MED GY IP 250 OP 250 PS 637: Performed by: INTERNAL MEDICINE

## 2025-05-11 PROCEDURE — 999N000157 HC STATISTIC RCP TIME EA 10 MIN

## 2025-05-11 PROCEDURE — 83735 ASSAY OF MAGNESIUM: CPT | Performed by: INTERNAL MEDICINE

## 2025-05-11 PROCEDURE — 36415 COLL VENOUS BLD VENIPUNCTURE: CPT

## 2025-05-11 PROCEDURE — 250N000013 HC RX MED GY IP 250 OP 250 PS 637: Performed by: STUDENT IN AN ORGANIZED HEALTH CARE EDUCATION/TRAINING PROGRAM

## 2025-05-11 PROCEDURE — 250N000011 HC RX IP 250 OP 636: Mod: JZ

## 2025-05-11 PROCEDURE — 120N000001 HC R&B MED SURG/OB

## 2025-05-11 PROCEDURE — 82310 ASSAY OF CALCIUM: CPT

## 2025-05-11 PROCEDURE — 250N000011 HC RX IP 250 OP 636: Mod: JZ | Performed by: STUDENT IN AN ORGANIZED HEALTH CARE EDUCATION/TRAINING PROGRAM

## 2025-05-11 PROCEDURE — 99233 SBSQ HOSP IP/OBS HIGH 50: CPT | Performed by: INTERNAL MEDICINE

## 2025-05-11 PROCEDURE — 97166 OT EVAL MOD COMPLEX 45 MIN: CPT | Mod: GO

## 2025-05-11 PROCEDURE — 85027 COMPLETE CBC AUTOMATED: CPT

## 2025-05-11 RX ORDER — METHOCARBAMOL 500 MG/1
500 TABLET, FILM COATED ORAL 4 TIMES DAILY
Status: DISCONTINUED | OUTPATIENT
Start: 2025-05-11 | End: 2025-05-12 | Stop reason: HOSPADM

## 2025-05-11 RX ORDER — OXYCODONE HYDROCHLORIDE 5 MG/1
10 TABLET ORAL EVERY 4 HOURS PRN
Refills: 0 | Status: DISCONTINUED | OUTPATIENT
Start: 2025-05-11 | End: 2025-05-12

## 2025-05-11 RX ORDER — OXYCODONE HYDROCHLORIDE 5 MG/1
5 TABLET ORAL
Refills: 0 | Status: DISCONTINUED | OUTPATIENT
Start: 2025-05-11 | End: 2025-05-12

## 2025-05-11 RX ADMIN — METHOCARBAMOL 500 MG: 500 TABLET ORAL at 13:22

## 2025-05-11 RX ADMIN — METHOCARBAMOL 500 MG: 500 TABLET ORAL at 16:48

## 2025-05-11 RX ADMIN — OXYCODONE HYDROCHLORIDE 10 MG: 5 TABLET ORAL at 17:33

## 2025-05-11 RX ADMIN — METHOCARBAMOL 500 MG: 500 TABLET ORAL at 21:41

## 2025-05-11 RX ADMIN — CEFAZOLIN SODIUM 2 G: 2 SOLUTION INTRAVENOUS at 10:27

## 2025-05-11 RX ADMIN — CEFAZOLIN SODIUM 2 G: 2 SOLUTION INTRAVENOUS at 00:23

## 2025-05-11 RX ADMIN — HYDROMORPHONE HYDROCHLORIDE 0.2 MG: 0.2 INJECTION, SOLUTION INTRAMUSCULAR; INTRAVENOUS; SUBCUTANEOUS at 10:25

## 2025-05-11 RX ADMIN — HYDROMORPHONE HYDROCHLORIDE 0.4 MG: 0.2 INJECTION, SOLUTION INTRAMUSCULAR; INTRAVENOUS; SUBCUTANEOUS at 03:52

## 2025-05-11 RX ADMIN — ACETAMINOPHEN 975 MG: 325 TABLET ORAL at 06:25

## 2025-05-11 RX ADMIN — KETOROLAC TROMETHAMINE 15 MG: 15 INJECTION, SOLUTION INTRAMUSCULAR; INTRAVENOUS at 06:28

## 2025-05-11 RX ADMIN — KETOROLAC TROMETHAMINE 15 MG: 15 INJECTION, SOLUTION INTRAMUSCULAR; INTRAVENOUS at 00:32

## 2025-05-11 RX ADMIN — OXYCODONE HYDROCHLORIDE 10 MG: 5 TABLET ORAL at 21:42

## 2025-05-11 RX ADMIN — HYDROXYZINE HYDROCHLORIDE 25 MG: 25 TABLET, FILM COATED ORAL at 17:34

## 2025-05-11 RX ADMIN — OXYCODONE HYDROCHLORIDE 10 MG: 5 TABLET ORAL at 12:22

## 2025-05-11 RX ADMIN — ASPIRIN 81 MG: 81 TABLET, COATED ORAL at 21:41

## 2025-05-11 RX ADMIN — OXYCODONE HYDROCHLORIDE 10 MG: 5 TABLET ORAL at 02:52

## 2025-05-11 RX ADMIN — POLYETHYLENE GLYCOL 3350 17 G: 17 POWDER, FOR SOLUTION ORAL at 10:23

## 2025-05-11 RX ADMIN — KETOROLAC TROMETHAMINE 15 MG: 15 INJECTION, SOLUTION INTRAMUSCULAR; INTRAVENOUS at 19:12

## 2025-05-11 RX ADMIN — ACETAMINOPHEN 975 MG: 325 TABLET ORAL at 14:57

## 2025-05-11 RX ADMIN — SENNOSIDES AND DOCUSATE SODIUM 1 TABLET: 50; 8.6 TABLET ORAL at 21:41

## 2025-05-11 RX ADMIN — HYDROXYZINE HYDROCHLORIDE 25 MG: 25 TABLET, FILM COATED ORAL at 10:39

## 2025-05-11 RX ADMIN — OXYCODONE HYDROCHLORIDE 5 MG: 5 TABLET ORAL at 07:18

## 2025-05-11 RX ADMIN — SENNOSIDES AND DOCUSATE SODIUM 1 TABLET: 50; 8.6 TABLET ORAL at 10:22

## 2025-05-11 RX ADMIN — HYDROMORPHONE HYDROCHLORIDE 0.2 MG: 0.2 INJECTION, SOLUTION INTRAMUSCULAR; INTRAVENOUS; SUBCUTANEOUS at 00:29

## 2025-05-11 RX ADMIN — KETOROLAC TROMETHAMINE 15 MG: 15 INJECTION, SOLUTION INTRAMUSCULAR; INTRAVENOUS at 14:13

## 2025-05-11 RX ADMIN — ASPIRIN 81 MG: 81 TABLET, COATED ORAL at 10:22

## 2025-05-11 ASSESSMENT — ACTIVITIES OF DAILY LIVING (ADL)
ADLS_ACUITY_SCORE: 36
ADLS_ACUITY_SCORE: 18
ADLS_ACUITY_SCORE: 18
ADLS_ACUITY_SCORE: 36
ADLS_ACUITY_SCORE: 18
ADLS_ACUITY_SCORE: 36
ADLS_ACUITY_SCORE: 18
ADLS_ACUITY_SCORE: 35
ADLS_ACUITY_SCORE: 36
ADLS_ACUITY_SCORE: 18
ADLS_ACUITY_SCORE: 36
DEPENDENT_IADLS:: INDEPENDENT
ADLS_ACUITY_SCORE: 35
ADLS_ACUITY_SCORE: 33
ADLS_ACUITY_SCORE: 18
ADLS_ACUITY_SCORE: 29
ADLS_ACUITY_SCORE: 33
ADLS_ACUITY_SCORE: 18
ADLS_ACUITY_SCORE: 33
ADLS_ACUITY_SCORE: 35
ADLS_ACUITY_SCORE: 33
ADLS_ACUITY_SCORE: 29

## 2025-05-11 NOTE — PLAN OF CARE
Problem: Adult Inpatient Plan of Care  Goal: Plan of Care Review  Description: The Plan of Care Review/Shift note should be completed every shift.  The Outcome Evaluation is a brief statement about your assessment that the patient is improving, declining, or no change.  This information will be displayed automatically on your shiftnote.  Outcome: Progressing   Goal Outcome Evaluation:                      Pt. Alert and oriented. Had pain rating 10/10. IV dilaudid and oxy 10mg given for pain. Dressing cdi. CAM boot in place on left leg. RA . Family in rm. Continue with POC.

## 2025-05-11 NOTE — PLAN OF CARE
PRIMARY DIAGNOSIS: ACUTE PAIN  OUTPATIENT/OBSERVATION GOALS TO BE MET BEFORE DISCHARGE:  1. Pain Status: Improved but still requiring IV narcotics.    2. Return to near baseline physical activity: No    3. Cleared for discharge by consultants (if involved): No    Discharge Planner Nurse   Safe discharge environment identified: Yes  Barriers to discharge: Yes       Entered by: Mariaa Hills RN 05/11/2025 8:21 AM     Please review provider order for any additional goals.   Nurse to notify provider when observation goals have been met and patient is ready for discharge.Goal Outcome Evaluation:

## 2025-05-11 NOTE — PLAN OF CARE
Problem: Adult Inpatient Plan of Care  Goal: Absence of Hospital-Acquired Illness or Injury  Intervention: Identify and Manage Fall Risk  Recent Flowsheet Documentation  Taken 5/11/2025 1020 by Vicki Garza RN  Safety Promotion/Fall Prevention:   activity supervised   assistive device/personal items within reach  Intervention: Prevent Skin Injury  Recent Flowsheet Documentation  Taken 5/11/2025 1020 by Vicki Garza RN  Body Position: position changed independently  Intervention: Prevent and Manage VTE (Venous Thromboembolism) Risk  Recent Flowsheet Documentation  Taken 5/11/2025 1020 by Vicki Garza RN  VTE Prevention/Management: SCDs off (sequential compression devices)  Intervention: Prevent Infection  Recent Flowsheet Documentation  Taken 5/11/2025 1020 by Vicki Garza RN  Infection Prevention: hand hygiene promoted     Problem: Skin Injury Risk Increased  Goal: Skin Health and Integrity  Intervention: Plan: Nurse Driven Intervention: Moisture Management  Recent Flowsheet Documentation  Taken 5/11/2025 1020 by Vicki Garza RN  Moisture Interventions:   No brief in bed   Incontinence pad  Taken 5/11/2025 0800 by Vicki Garza RN  Moisture Interventions:   Encourage regular toileting   Incontinence pad  Bathing/Skin Care: other (see comments)  Intervention: Plan: Nurse Driven Intervention: Friction and Shear  Recent Flowsheet Documentation  Taken 5/11/2025 1020 by Vicki Garza RN  Friction/Shear Interventions: HOB 30 degrees or less  Intervention: Optimize Skin Protection  Recent Flowsheet Documentation  Taken 5/11/2025 1020 by Vicki Garza RN  Activity Management: activity adjusted per tolerance  Head of Bed (HOB) Positioning: HOB at 30 degrees   Goal Outcome Evaluation:       Pt alert & oriented x4. VS stable on RA. WB as tolerated per orders. Pt experiencing a lot of pain throughout shift despite pain medications. New orders placed for scheduled  Robaxin. Continue to keep up w/ PRN pain medications alternating dilaudid & oxy to keep pain somewhat at bay. PRN dilaudid IV & atarax & oxy all given x1 this shift. CIWA scoring 1 for slight anxiety. Pt able to pivot to bedside commode.

## 2025-05-11 NOTE — ANESTHESIA CARE TRANSFER NOTE
Patient: Glenda Chang    Procedure: Procedure(s):  OPEN REDUCTION INTERNAL FIXATION, FRACTURE, TIBIA, USING INTRAMEDULLARY GWENDOLYN       Diagnosis: Tibia/fibula fracture, left, closed, initial encounter [S82.202A, S82.402A]  Diagnosis Additional Information: No value filed.    Anesthesia Type:   General     Note:    Oropharynx: oropharynx clear of all foreign objects  Level of Consciousness: awake  Oxygen Supplementation: room air    Independent Airway: airway patency satisfactory and stable  Dentition: dentition unchanged  Vital Signs Stable: post-procedure vital signs reviewed and stable  Report to RN Given: handoff report given  Patient transferred to: PACU    Handoff Report: Identifed the Patient, Identified the Reponsible Provider, Reviewed the pertinent medical history, Discussed the surgical course, Reviewed Intra-OP anesthesia mangement and issues during anesthesia, Set expectations for post-procedure period and Allowed opportunity for questions and acknowledgement of understanding      Vitals:  Vitals Value Taken Time   /87 05/10/25 20:15   Temp     Pulse 99 05/10/25 20:14   Resp 13 05/10/25 20:14   SpO2 96 % 05/10/25 20:14   Vitals shown include unfiled device data.    Electronically Signed By: SARA Hastings CRNA  May 10, 2025  8:16 PM

## 2025-05-11 NOTE — BRIEF OP NOTE
[facility]    Brief Operative Note    Pre-operative diagnosis: Tibia/fibula fracture, left, closed, initial encounter [S82.202A, S82.402A]  Post-operative diagnosis Same as pre-operative diagnosis    Procedure: OPEN REDUCTION INTERNAL FIXATION, FRACTURE, TIBIA, USING INTRAMEDULLARY GWENDOLYN, Left - Leg    Surgeon: Surgeons and Role:     * Jose Santillan MD - Primary  Anesthesia: General   Estimated Blood Loss: 150 mL    Drains: None  Specimens: * No specimens in log *  Findings:   See op report.  Complications: None.  Implants:   Implant Name Type Inv. Item Serial No.  Lot No. LRB No. Used Action   GW ORTH 800MM 3MM T2 ALPHA STRL LF DISP 2351-3082S - SNA Wire GW ORTH 800MM 3MM T2 ALPHA STRL LF DISP 2351-3082S NA [employer] [id number] Left 1 Implanted   WIRE FX 3MM 285MM KRSH STRL LF - SNA  WIRE FX 3MM 285MM KRSH STRL LF NA [employer] [id number] Left 1 Implanted   NAIL 9MM 315MM TIB T2 ALPHA IM STRL 2341-0931S - SNA [employer]allic [employer]/[place] NAIL 9MM 315MM TIB T2 ALPHA IM STRL 2341-0931S NA [employer] [id number] Left 1 Implanted   SCREW CANNULATED AS3 4X36MM - SNA Metallic [employer]ware/[place] SCREW CANNULATED AS3 4X36MM NA [employer] NA Left 1 Implanted   SCREW BN 55MM 5MM LCK STRL T2 ALPHA - SNA [employer] SCREW BN 55MM 5MM LCK STRL T2 ALPHA NA [employer] [id number] Left 1 Implanted   SCREW BN 40MM 5MM LCK STRL T2 ALPHA - [id number] Metallic Hardware/[place] SCREW BN 40MM 5MM LCK STRL T2 ALPHA  [employer] [id number] Left 1 Implanted   SCREW BN 35MM 5MM LCK STRL T2 ALPHA - SNA Metallic Hardware/[place] SCREW BN 35MM 5MM LCK STRL T2 ALPHA NA [employer] [id number] Left 1 Implanted   SCREW BN 27.5MM 5MM LCK STRL T2 ALPHA FEM NL SYS 2360-5027S - [id number] Metallic Hardware/[place] SCREW BN 27.5MM 5MM LCK STRL T2 ALPHA FEM NL SYS 2360-5027S  [employer] [id number] Left 1 Wasted   SCREW BN 30MM 5MM LCK STRL T2 ALPHA FEM NL SYS  2360-5030S - [id number] Metallic Hardware/[place] SCREW BN 30MM 5MM LCK STRL T2 ALPHA FEM NL SYS 4990-5030S  [employer] [id number] Left 1 Implanted   SCREW BN 37.5MM 5MM LCK STRL T2 ALPHA - SNA Metallic Hardware/[place] SCREW BN 37.5MM 5MM LCK STRL T2 ALPHA NA [id number] Left 1 Implanted   CAP END 5MM OFST TIB STRL T2 ALPHA 11.5MM LF 2341-0005S - SNA Metallic Hardware/[place] CAP END 5MM OFST TIB STRL T2 ALPHA 11.5MM LF 2341-0005S NA [employer] [id number] Left 1 Implanted

## 2025-05-11 NOTE — PLAN OF CARE
Goal Outcome Evaluation:      Plan of Care Reviewed With: patient          Outcome Evaluation: Goal is to return home with family support. Family to transport at discharge. CM following for recommendations.

## 2025-05-11 NOTE — CONSULTS
Care Management Initial Consult    General Information  Assessment completed with: Patient,    Type of CM/SW Visit: Initial Assessment    Primary Care Provider verified and updated as needed: Yes   Readmission within the last 30 days: no previous admission in last 30 days      Reason for Consult: discharge planning  Advance Care Planning:            Communication Assessment  Patient's communication style: spoken language (English or Bilingual)    Hearing Difficulty or Deaf: no   Wear Glasses or Blind: no    Cognitive  Cognitive/Neuro/Behavioral: WDL                      Living Environment:   People in home: child(marcel), adult, child(marcel), dependent, spouse     Current living Arrangements: house      Able to return to prior arrangements: yes       Family/Social Support:  Care provided by: self  Provides care for: child(marcel)  Marital Status:   Support system: , Children, Sibling(s), Friend  Jerod       Description of Support System: Supportive, Involved    Support Assessment: Adequate family and caregiver support    Current Resources:   Patient receiving home care services: No        Community Resources: None  Equipment currently used at home: none  Supplies currently used at home: None    Employment/Financial:  Employment Status: employed full-time        Financial Concerns: none   Referral to Financial Worker: No       Does the patient's insurance plan have a 3 day qualifying hospital stay waiver?  No    Lifestyle & Psychosocial Needs:  Social Drivers of Health     Food Insecurity: Low Risk  (5/10/2025)    Food Insecurity     Within the past 12 months, did you worry that your food would run out before you got money to buy more?: No     Within the past 12 months, did the food you bought just not last and you didn t have money to get more?: No   Depression: Not at risk (4/2/2024)    PHQ-2     PHQ-2 Score: 0   Housing Stability: Low Risk  (5/10/2025)    Housing Stability     Do you have housing? : Yes      Are you worried about losing your housing?: No   Tobacco Use: Medium Risk (5/10/2025)    Patient History     Smoking Tobacco Use: Former     Smokeless Tobacco Use: Never     Passive Exposure: Not on file   Financial Resource Strain: Low Risk  (5/10/2025)    Financial Resource Strain     Within the past 12 months, have you or your family members you live with been unable to get utilities (heat, electricity) when it was really needed?: No   Alcohol Use: Not on file   Transportation Needs: Low Risk  (5/10/2025)    Transportation Needs     Within the past 12 months, has lack of transportation kept you from medical appointments, getting your medicines, non-medical meetings or appointments, work, or from getting things that you need?: No   Physical Activity: Unknown (4/2/2024)    Exercise Vital Sign     Days of Exercise per Week: 4 days     Minutes of Exercise per Session: Not on file   Interpersonal Safety: Not on file   Stress: No Stress Concern Present (4/2/2024)    [employer] [place] [employer] - Occupational Stress Questionnaire     Feeling of Stress : Only a little   Social Connections: Unknown (4/2/2024)    Social Connection and Isolation Panel [NHANES]     Frequency of Communication with Friends and Family: Not on file     Frequency of Social Gatherings with Friends and Family: Twice a week     Attends Sabianism Services: Not on file     Active Member of Clubs or Organizations: Not on file     Attends Club or Organization Meetings: Not on file     Marital Status: Not on file   Health Literacy: Not on file       Functional Status:  Prior to admission patient needed assistance:   Dependent ADLs:: Independent, Ambulation-no assistive device  Dependent IADLs:: Independent       Mental Health Status:  Mental Health Status: No Current Concerns       Chemical Dependency Status:  Chemical Dependency Status: No Current Concerns             Values/Beliefs:  Spiritual, Cultural Beliefs, Sabianism Practices, Values  that affect care: no               Discussed  Partnership in Safe Discharge Planning  document with patient/family: No    Additional Information:  Initial CM Assessment completed with Pt. Pt lives in a house with her  Jerod and four children. Pt is independent and drives at baseline. Pt does not use any DME or community support services. Pt reports she works full-time and has the ability to work from home while recovering from her hospital stay. Goal is to return home with family support. Family to transport at discharge.    Next Steps: CM will continue to follow Pt's medical progression for Care Team recommendations in order to assist with discharge planning.       CJ Muir

## 2025-05-11 NOTE — OP NOTE
PATIENT: Glenda Chang    MEDICAL RECORD #: [id number]    DATE OF OPERATION: 5/10/2025    PREOPERATIVE DIAGNOSIS:   Closed left distal third tibial shaft fracture  Closed nondisplaced left posterior malleolus fracture  Closed left proximal third fibular fracture    POSTOPERATIVE DIAGNOSIS:   Closed left distal third tibial shaft fracture  Closed nondisplaced left posterior malleolus fracture  Closed left proximal third fibular fracture    OPERATION:   Closed reduction and intramedullary fixation of the left diaphyseal tibial fracture  Percutaneous fixation of left posterior malleolus fracture    SURGEON: Jose Santillan MD    ASSISTANT(s): Ya Steele PA-C    A skilled assistant was required due to the nature of the case for patient position, retraction, exposure, implant placement, closure and dressing application.    ANESTHESIA: General endotracheal anesthesia, fascia    COMPLICATIONS: No Immediate    ANTIBIOTICS: Cefazolin in a weight based dose IV given within 30 minutes prior to skin incision    OTHER MEDICATIONS: Tranexamic acid 1000 mg IV prior to start of case    EBL: 150 mL    FINDINGS: Spiral distal third diaphyseal tibial shaft fracture that was able to be exceptionally close reduced and fixed with a intramedullary tibial nail.  Posterior malleolus fracture that was nondisplaced was percutaneously fixed with a 4.0 mm cannulated screw prior to reduction and fixation of the tibia fracture.    IMPLANTS:  System: Andria T2 alpha  315 mm x 9 mm tibial nail  5.0 mm interlocking screws x 4  +5 mm Endcap    Andria Anser: 4.0 mm cancellous screw    PATIENT HISTORY:  The patient is a 45 year old year-old female without significant past medical history who sustained a closed left tibial shaft fracture as the result of a ground level fall. After considering the patient's condition and the impact of their injury on the patient's quality of life a closed versus open reduction and surgical fixation  procedure was offered to the patient as a reasonable option.    Prior to the surgery I discussed the nature of the surgery including alternatives to surgery and the purpose of, and indications for proceeding with surgery. I discussed that this surgery is a shared decision between the patient and the surgeon.    Risks and benefits and alternatives of the procedure have been explained to the patient and their family.  Anesthesia complications and risks include but are not limited to stroke, heart attack, and death. The surgical risks include but are not limited to infection, bleeding, nerve and blood vessel injury, deep vein thrombosis, pulmonary embolus, stiffness, pain, scar, need for reoperation, rotational or leg length discrepancy, numbness, weakness, and mechanical failure of the implant including loosening, metal complications, metal allergy, wear or breakage, nonunion, malunion, need for unplanned future surgery.  I discussed the expected recovery from surgery and the importance of compliance with all our pre and post-operative recommendations in order to maximize the recovery.  A signed and witnessed consent was obtained and placed in the chart.    DESCRIPTION OF PROCEDURE:  The patient was identified in the preoperative area. A signed and witness consent was confirmed in the chart. The surgery team confirmed with the patient the operative plan and surgical site. The surgical site was confirmed by the patient and marked by the surgeon. The patient was given the opportunity to ask any further questions and all questions were answered.     The patient was brought to the operating room where anesthesia was induced by the anesthesia team without incident.     PATIENT POSITIONING:   The patient was placed supine on the radiolucent table.  A bone foam ramp was placed under the operative extremity.     TIME OUT:  A timeout was performed prior to the procedure which verified the correct patient, positioning,  operation to be performed, operative site, antibiotics, allergies, imaging, and any other concerns. All parties were in agreement.     PROCEDURE IN DETAIL:  The patient's left lower extremity was prepped and draped in the standard fashion. A final timeout was then performed confirming patient, procedure, laterality, and surgical site. All in the room agreed.    Due to the nondisplaced posterior malleolus fracture seen on the preoperative CT percutaneous fixation of this was performed first in order to mitigate any chance of displacement or propagation of that fracture.  Utilizing fluoroscopy the anterior lateral aspect of the distal tibia at the physeal scar was localized both on AP and lateral images.  Then utilizing a 15 blade knife the skin was incised just lateral to the palpable EDC tendons.  Blunt dissection was carried down to the anterior distal tibia.  A drill guide was placed on the bone and was confirmed on the AP and lateral imaging.  A guidepin was then placed from an anterior to posterior direction and was again confirmed on orthogonal fluoroscopic imaging.  This was then measured, overdrilled, and a 4.0 mm cancellous screw was placed.  This had good fixation.  There was no evidence of distraction or displacement of the posterior malleolus fracture on rotational imaging of the ankle.    Next, a suprapatellar approach was made to the knee.  A 4 cm incision was made just superior to the superior pole of the patella in the midline.  Skin was incised and the subcutaneous tissues were dissected with electrocautery down to the level of the tendon.  This was split in the midline with a fresh 10 blade knife.  The knee that was visible was inspected and there was no notable degenerative changes.  The cartilage felt smooth to palpation.  The guidepin was then passed through the knee and placed in the correct start point on both the AP and lateral image.  This was then impacted and driven into the proximal tibia  and confirmed in the correct position.  The guide sleeve and trocar were then placed over this guidepin.  Then the opening reamer was utilized in order to open the proximal tibia.  A ball-tipped guidewire was then placed and passed to the level of the fracture site.  The fracture was then localized and utilizing both traction and rotation the fracture was able to be acceptably reduced and the ball-tipped guidewire was passed across the fracture and into the physeal scar of the ankle in the center position.    The guidewire position was confirmed on AP and lateral imaging throughout the tibia.  The guidewire was measured and confirmed that a 315 mm nail would be acceptable.  Sequential reaming was then performed starting with a 9 mm reamer.  The patient had a very small diaphyseal canal, so great care was taken in order to slowly sequentially reamed up from a 9 mm reamer to a 9.5 mm reamer to a 10 mm reamer and then subsequently 10.5 mm reamer.  The 10.5 mm reamer had excellent cortical contact.  A 9.0 mm x 315 mm tibial nail was then selected, opened, and assembled on the insertion guide.  The nail was then passed over the ball-tipped guidewire into the tibia.  This was monitored on AP and lateral fluoroscopic imaging with regards to rotation and reduction at the fracture site.  Reduction was maintained throughout.  The tibial nail was passed without issue.  The fit was excellent throughout the diaphysis of the tibia.  There was slight posterior step-off of the cortex on the lateral radiograph, but the AP radiograph appeared anatomic.  The rotation appeared appropriate based on the fracture lines visible on the imaging.    The aiming guide was then assembled on the tibial nail and 2 oblique 5.0 mm interlocking screws in the proximal portion of the nail were placed.  Next, 2 medial to lateral 5.0 mm interlocking screws in the distal portion of the nail were placed utilizing perfect [place] technique.  All screws were  confirmed to be through the nail, and in appropriate position on orthogonal fluoroscopic imaging.    Due to the position of the nail proximally a +5 mm end cap was placed on the proximal portion of the nail.  The aiming guide and the sleeves were all removed prior to this.  Final AP and lateral fluoroscopic imaging were taken throughout the leg.    The knee was irrigated thoroughly with normal saline removing any excess bone debris.  The focal incisions throughout were irrigated thoroughly with normal saline as well.    The focal incisions were all closed with 2-0 Vicryl in the deep dermal layer followed by 2-0 nylon in the skin layer.  The knee incision was closed with #1 Vicryl in the quadriceps tendon followed by #1 strata fix in the quadricep tendon.  Then subsequently, 0 Vicryl was used in the deep fat, 2-0 Vicryl in the deep dermal layer, and 3-0 Monocryl on the skin in a subcuticular fashion followed by skin glue and Steri-Strips.  Sterile dressings were placed throughout followed by an Ace wrap.  The patient was placed in a cam boot.    The drapes were then taken down and the patient was placed supine. The patient's lower extremities were warm and well perfused with brisk capillary refill and palpable pulses. The patient was then awoken, transferred to the hospital cart and taken to the PACU in stable condition. The patient tolerated the procedure well. The patient's family/caregivers were made aware of their condition.    Final sponge and needle counts were correct x2.    POSTOPERATIVE PLAN:  Pain Control: Continue per multimodal pain protocol. Minimize opioid pain medications as able.  Weight Bearing: Weight bearing as tolerated in CAM boot and with walker/crutches at all times  Bracing: CAM boot at all times except hygiene, skin checks  DVT Prophylaxis: Risk stratified DVT prophylaxis. Aspirin 81 mg BID x 4 weeks. Early ambulation and SCDs while in bed.  Antibiotics: 24 hours of perioperative IV  antibiotics  Diet: Advance diet as tolerated from orthopedic perspective  GI: Plan for aggressive bowel regimen to prevent constipation from opioid medications  Lines: HLIV once tolerating PO  PT/OT: Eval and treatment.  Follow up:  2 weeks with Dr. Santillan team for wound check, suture removal, and XR.  Discharge plan: Pending PT/OT evaluation, pain control, anticipate discharge home on POD 1    Jose Santillan MD  [facility]

## 2025-05-11 NOTE — PROGRESS NOTES
"[facility]    Medicine Progress Note - Hospitalist Service    Date of Admission:  5/10/2025    Assessment & Plan   Glenda Chang is a 45 year old female who was admitted on 5/10/2025. She was seen at [facility] outpatient after a fall at home, found to have distal tibial shaft and fibular shaft fracture. Orthopedics consulted and underwent surgical fixation 5/10/25     POD 1 - ORIF tibia with intramedullary kassandra    Presented to the ED from ortho clinic with traumatic distal Left Tib-Fib Fracture and Left Lateral Malleoli Avulsion Fracture     Having a lot of pain this am post-op    Further post-op cares including activity, DVT prophylaxis and pain meds per surgical service    Traumatic Fall at Home   Had been drinking, fell last evening while making her bed. States she was unable to get up and so stayed on the floor for several hours. She denies any head injury, does not believe she lost consciousness but states she does not \"fully remember\" how or why she fell   -- Declined CT Head and C spine   -- Normal CK  noted    --only able to pivot from bed to chair with PT this am d/t severe pain    OT has yet to see      Alcohol Use   States she had a full bottle of wine prior to fall. She states she drinks about 2 days per week and will have ~5 drinks each time. She denies any history of alcohol dependence or withdrawal   -- Last EtOH intake 5/9 PM   -- CIWA scores have been zero, so far     Check magnesium    Leukocytosis   Suspect reactive although slight increase in neutrophils     Increasing today  Will check UA/UC    Severe post-op pain    In a lot of pain currently  Suspect she may have a higher pain tolerance d/t regular alcohol use  Able to get IV dilaudid, recently got po oxycodone    Discussed in detail with bedside RN, pt, family present.  Will attempt to stay on top of pain with oral agents and prn IV as needed    Increase availability of prn oxycodone to q3h    Atarax ordered prn  Will " order robaxin    Elevated BP without the Diagnosis of Hypertension   Likely due to acute pain   -- Focus on pain control   -- Monitor      IUD in place   HCG urine test requested       PPE Used:  Mask, gloves          Diet: Advance Diet as Tolerated: Regular Diet Adult    DVT Prophylaxis: ASA 81mg po bid as per surgery  Clement Catheter: Not present  Lines: None     Cardiac Monitoring: None  Code Status: Full Code      Clinically Significant Risk Factors Present on Admission          # Hyperchloremia: Highest Cl = 108 mmol/L in last 2 days, will monitor as appropriate              # Hypertension: Noted on problem list                      Disposition Plan     Medically Ready for Discharge: Anticipated in 2-4 Days             Lauren Carbone DO  Hospitalist Service  [facility]  Securely message with [employer] (more info)  Text page via [employer] Paging/Directory   ______________________________________________________________________    Interval History     Having a lot of pain this am after attempt to bear weight on ankle to pivot to the chair.  Had been using smaller doses of po oxycodone with IV prn dilaudid.    + slight nausea now from pain - no emesis.  No anterior chest pain or pleuritic chest pain.  No HA.  Feeling diaphoretic from pain.      Slept on and off    Physical Exam   Vital Signs: Temp: 97.8  F (36.6  C) Temp src: Oral BP: 119/58 Pulse: 76   Resp: 18 SpO2: 97 % O2 Device: None (Room air)    Weight: 165 lbs 0 oz    GEN:  Alert, oriented x 3, appears very uncomfortable and in moderate distress  grimacing  HEENT:  Normocephalic/atraumatic, no scleral icterus, no nasal discharge  CV:  Regular rate and rhythm  LUNGS:  Clear to auscultation ant/lat bilaterally without rales/rhonchi/wheezing/retractions.  Symmetric chest rise on inhalation noted.  EXT:  left leg in immobilizer  No edema or cyanosis right leg.    SKIN:  Dry to touch, no exanthems noted in the visualized  areas.    Medical Decision Making       52 MINUTES SPENT BY ME on the date of service doing chart review, history, exam, documentation & further activities per the note.      Data   Medications   Current Facility-Administered Medications   Medication Dose Route Frequency Provider Last Rate Last Admin    lactated ringers infusion   Intravenous Continuous Ya Steele PA-C   Stopped at 05/11/25 0302     Current Facility-Administered Medications   Medication Dose Route Frequency Provider Last Rate Last Admin    acetaminophen (TYLENOL) tablet 975 mg  975 mg Oral Q8H Ya Steele PA-C   975 mg at 05/11/25 0625    aspirin EC tablet 81 mg  81 mg Oral BID Ya Steele PA-C   81 mg at 05/10/25 2225    ceFAZolin (ANCEF) 2 g in dextrose 50 mL intermittent infusion  2 g Intravenous Q8H Ya Steele PA-C   2 g at 05/11/25 0023    Contraindications to both pharmacological and mechanical prophylaxis (must document contraindications for both in this order)   Does not apply See Admin Instructions Daly Teixeira PA-C        ketorolac (TORADOL) injection 15 mg  15 mg Intravenous Q6H Ya Steele PA-C   15 mg at 05/11/25 0628    polyethylene glycol (MIRALAX) Packet 17 g  17 g Oral Daily Ya Steele PA-C        senna-docusate (SENOKOT-S/PERICOLACE) 8.6-50 MG per tablet 1 tablet  1 tablet Oral BID Ya Steele PA-C   1 tablet at 05/10/25 2225    sodium chloride (PF) 0.9% PF flush 3 mL  3 mL Intracatheter Q8H [facility] Daly Teixeira PA-C   3 mL at 05/11/25 0632    sodium chloride (PF) 0.9% PF flush 3 mL  3 mL Intracatheter Q8H Ya Steele PA-C   3 mL at 05/11/25 0631     Labs and Imaging results below reviewed today.  Recent Labs   Lab 05/11/25  0613 05/10/25  1217   WBC 15.8* 13.0*   HGB 11.1* 13.2   HCT 33.1* 40.1   * 101*    275     Recent Labs   Lab 05/11/25  0613 05/10/25  1217    141   POTASSIUM 3.8 4.1   CHLORIDE 104 108*   CO2 23  22   ANIONGAP 9 11   * 108*   BUN 14.4 13.0   CR 0.73 0.81   GFRESTIMATED >90 >90   CARYL 7.8* 9.0     7-Day Micro Results       No results found for the last 168 hours.          Recent Labs   Lab 05/11/25  0613 05/10/25  1217    141   POTASSIUM 3.8 4.1   CHLORIDE 104 108*   CO2 23 22   ANIONGAP 9 11   * 108*   BUN 14.4 13.0   CR 0.73 0.81   GFRESTIMATED >90 >90   CARYL 7.8* 9.0     UA - pending  HCG urine test - pending    Recent Results (from the past 24 hours)   CT Tibia Fibula Lower Leg Left wo Contrast    Narrative    EXAM: CT TIBIA FIBULA LOWER LEG LEFT WO CONTRAST  LOCATION: [facility]  DATE: 5/10/2025    INDICATION: Eval ankle fx. Eval tib fib fx  COMPARISON:  None available.  TECHNIQUE: Noncontrast. Axial, sagittal and coronal thin-section reconstruction. Dose reduction techniques were used.     FINDINGS:     BONES:  - Acute, mildly displaced oblique fracture involving the proximal fibular shaft with up to 4 mm displacement.    Acute, displaced oblique fracture involving the distal one third of the tibial shaft with up to 9 mm displacement. There is nondisplaced oblique/spiral extension of the fracture through the base of the posterior malleolus and into the central aspect of   the tibial plafond at the tibiotalar joint in keeping with intra-articular extension of the fracture.    There is a small avulsive fracture fragment along the anterior aspect of the lateral malleolus near the distal syndesmotic region, favor avulsive.    No additional fractures identified.    SOFT TISSUES:  -Soft tissue swelling and blood products about the mid to distal calf and ankle.      Impression    IMPRESSION:  1.  Mildly displaced distal tibia shaft fracture, with nondisplaced oblique/spiral extension distally into the tibiotalar joint by means of the tibial plafond at base of the posterior malleolus.    2.  Mildly displaced oblique fracture proximal fibular shaft.    3.  Small  avulsion type fracture along the anterior aspect of the lateral malleolus near the distal syndesmotic region.    4.  Surrounding soft tissue swelling and poorly defined blood products.      NOTE: ABNORMAL REPORT    THE DICTATION ABOVE DESCRIBES AN ABNORMALITY FOR WHICH FOLLOW-UP IS NEEDED.    XR External Imaging Lower Extremity Left    Narrative    Images were obtained from an external facility.  Click PACS Images   hyperlink to view images.  Textual results have been scanned into the   media tab.   XR Surgery HOLA Fluoro L/T 5 Min    Narrative    This exam was marked as non-reportable because it will not be read by a   radiologist or a [place] non-radiologist provider.         XR Tibia & Fibula Port Left 2 Views    Narrative    EXAM: XR TIBIA and FIBULA PORT LEFT 2 VIEWS  LOCATION: [facility]  DATE: 5/10/2025    INDICATION: Tibal IM nail  COMPARISON: 5/10/2025      Impression    IMPRESSION:      There are immediate postoperative changes from intramedullary kassandra and screw fixation of the left tibial fracture in good anatomic alignment. Similar appearing fracture of the proximal fibular shaft.

## 2025-05-11 NOTE — PROGRESS NOTES
"   05/11/25 0830   Appointment Info   Signing Clinician's Name / Credentials (PT) Aura Angeles, PT, DPT   Quick Adds   Quick Adds Certification   Living Environment   People in Home spouse;child(marcel), adult;child(marcel), dependent   Current Living Arrangements house   Home Accessibility stairs within home;stairs to enter home   Number of Stairs, Main Entrance 2   Stair Railings, Main Entrance none   Number of Stairs, Within Home, Primary greater than 10 stairs   Stair Railings, Within Home, Primary railings safe and in good condition   Self-Care   Equipment Currently Used at Home none   Fall history within last six months yes   Number of times patient has fallen within last six months 1   Activity/Exercise/Self-Care Comment Pt independent with all ADLs/IADLs. Works from home full time at baseline.   General Information   Onset of Illness/Injury or Date of Surgery 05/10/25   Referring Physician Ya Steele PA-C   Patient/Family Therapy Goals Statement (PT) None stated.   Pertinent History of Current Problem (include personal factors and/or comorbidities that impact the POC) Per H&P: \"45 year old female who was admitted on 5/10/2025. She was seen at [facility] outpatient after a fall at home, found to have distal tibial shaft and fibular shaft fracture. Orthopedics consulted ; planning on surgical fixation later this evening \"   Existing Precautions/Restrictions fall   Weight-Bearing Status - LLE weight-bearing as tolerated  (in CAM boot at all times)   Pain Assessment   Patient Currently in Pain Yes, see Vital Sign flowsheet  (distal quad pain with activity)   Range of Motion (ROM)   Range of Motion ROM deficits secondary to surgical procedure   ROM Comment L ankle immobilized in CAM boot   Strength (Manual Muscle Testing)   Strength (Manual Muscle Testing) strength is WFL   Bed Mobility   Bed Mobility supine-sit   Supine-Sit [place] (Bed Mobility) minimum assist (75% patient effort)   Bed Mobility " Limitations decreased ability to use legs for bridging/pushing   Impairments Contributing to Impaired Bed Mobility pain;decreased strength   Assistive Device (Bed Mobility) bed rails   Comment, (Bed Mobility) Assist to bring L LE off EOB.   Transfers   Transfers sit-stand transfer;bed-chair transfer   Transfer Safety Concerns Noted decreased weight-shifting ability   Impairments Contributing to Impaired Transfers pain;decreased strength   Bed-Chair Transfer   Bed-Chair [place] (Transfers) contact guard;verbal cues   Assistive Device (Bed-Chair Transfers) walker, front-wheeled   Sit-Stand Transfer   Sit-Stand [place] (Transfers) contact guard;verbal cues   Assistive Device (Sit-Stand Transfers) walker, front-wheeled   Gait/Stairs (Locomotion)   [place] Level (Gait) contact guard;verbal cues   Assistive Device (Gait) walker, front-wheeled   Distance in Feet (Gait) 3' bed > commode   Pattern (Gait) step-to   Deviations/Abnormal Patterns (Gait) ladonna decreased;gait speed decreased   Clinical Impression   Criteria for Skilled Therapeutic Intervention Yes, treatment indicated   PT Diagnosis (PT) impaired functional mobility   Influenced by the following impairments decreased strength, impaired balance, pain, orthopedic restrictions   Functional limitations due to impairments transfers, ambulation, stairs   Clinical Presentation (PT Evaluation Complexity) evolving   Clinical Presentation Rationale Clinical judgment.   Clinical Decision Making (Complexity) moderate complexity   Planned Therapy Interventions (PT) balance training;bed mobility training;gait training;home exercise program;neuromuscular re-education;patient/family education;ROM (range of motion);stair training;strengthening;transfer training   Risk & Benefits of therapy have been explained evaluation/treatment results reviewed;participants voiced agreement with care plan;participants included;patient   PT Total Evaluation Time   PT [place]  Moderate Complexity Minutes (98088) 15   Therapy Certification   Start of care date 05/11/25   Certification date from 05/11/25   Certification date to 05/18/25   Medical Diagnosis tibia/fibula fracture, left, closed, initial encounter   Physical Therapy Goals   PT Frequency Daily   PT Predicted Duration/Target Date for Goal Attainment 05/18/25   PT Goals Bed Mobility;Transfers;Gait;Stairs   PT: Bed Mobility Supervision/stand-by assist;Supine to/from sit   PT: Transfers Supervision/stand-by assist;Sit to/from stand;Assistive device   PT: Gait Supervision/stand-by assist;Assistive device;Rolling walker;50 feet   PT: Stairs Minimal assist;2 stairs   Interventions   Interventions Quick Adds Therapeutic Activity   Therapeutic Activity   Therapeutic Activities: dynamic activities to improve functional performance Minutes (44405) 10   Symptoms Noted During/After Treatment Increased pain;Fatigue   Treatment Detail/Skilled Intervention Patient completes second pivot transfer commode > chair with FWW, CGA. Requires assist to adjust positioning of L LE with sit <> stand due to pain in distal quad with trying to extend L LE. Pt opts to maintain TTWB/NWB with transfers using walker. Pt eager to discharge home today. Educated pt on need to manage stairs at home and trial prior to discharge.   PT Discharge Planning   PT Plan transfers, ambulation, stairs (2 SUKHJINDER, no rails), CAM boot at all times   PT Discharge Recommendation (DC Rec) other (see comments)  (anticipating home with home PT pending ability to safely manage stairs)   PT Rationale for DC Rec Patient is able to complete transfers with CGA. Lives with spouse and some adult children who can assist. Pt must navigate 2 stairs to enter home. Pending ability to safely manage stairs, anticipate pt will be safe to discharge home with help from family. Pt only able to pivot this session and will require additional therapy sessions.   PT Brief overview of current status Supine >  sit, min assist of 1. Sit <> stand and pivot to chair, min assist of 1.   PT Total Distance Amb During Session (feet) 3   PT Equipment Needed at Discharge walker, rolling  (pending progress)   Physical Therapy Time and Intention   Timed Code Treatment Minutes 10   Total Session Time (sum of timed and untimed services) 25     [facility]                                                                                   OUTPATIENT PHYSICAL THERAPY    PLAN OF TREATMENT FOR OUTPATIENT REHABILITATION   Patient's Last Name, First Name, Glenda Thorne YOB: 1980   Provider's Name   [facility]   Medical Record No.  [id number]     Onset Date: 05/10/25 Start of Care Date: 05/11/25     Medical Diagnosis:  tibia/fibula fracture, left, closed, initial encounter               PT Diagnosis:  impaired functional mobility Certification Dates:  From: 05/11/25  To: 05/18/25       See note for plan of treatment, functional goals, and certification details.    I CERTIFY THE NEED FOR THESE SERVICES FURNISHED UNDER        THIS PLAN OF TREATMENT AND WHILE UNDER MY CARE (Physician co-signature of this document indicates review and certification of the therapy plan).

## 2025-05-11 NOTE — PROGRESS NOTES
Pt seen and examined with RN and family in room. Full note to follow. Having a lot of pain currently----had oxycodone and about to receive IV dilaudid.  Was not able to work with PT d/t pain----only able to pivot to the chair.  Increase frequency of po prn oxycodone, add robaxin.  Atarax also available prn.  Not ready for discharge today d/t poorly controlled pain.

## 2025-05-11 NOTE — PLAN OF CARE
Goal Outcome Evaluation:                  PRIMARY DIAGNOSIS: ACUTE PAIN  OUTPATIENT/OBSERVATION GOALS TO BE MET BEFORE DISCHARGE:  1. Pain Status: Improved but still requiring IV narcotics.    2. Return to near baseline physical activity: No    3. Cleared for discharge by consultants (if involved): No    Discharge Planner Nurse   Safe discharge environment identified: Yes  Barriers to discharge: Yes       Entered by: Mariaa Hills RN 05/11/2025 8:22 AM     Please review provider order for any additional goals.   Nurse to notify provider when observation goals have been met and patient is ready for discharge.

## 2025-05-11 NOTE — ANESTHESIA POSTPROCEDURE EVALUATION
Patient: Glenda Chang    Procedure: Procedure(s):  OPEN REDUCTION INTERNAL FIXATION, FRACTURE, TIBIA, USING INTRAMEDULLARY GWENDOLYN       Anesthesia Type:  General    Note:  Disposition: Inpatient   Postop Pain Control: Uneventful            Sign Out: Well controlled pain   PONV: No   Neuro/Psych: Uneventful            Sign Out: Acceptable/Baseline neuro status   Airway/Respiratory: Uneventful            Sign Out: Acceptable/Baseline resp. status   CV/Hemodynamics: Uneventful            Sign Out: Acceptable CV status; No obvious hypovolemia; No obvious fluid overload   Other NRE: NONE   DID A NON-ROUTINE EVENT OCCUR?            Last vitals:  Vitals Value Taken Time   /87 05/10/25 20:15   Temp 36.2  C (97.2  F) 05/10/25 20:07   Pulse 94 05/10/25 20:17   Resp 12 05/10/25 20:17   SpO2 95 % 05/10/25 20:17   Vitals shown include unfiled device data.    Electronically Signed By: Ismael Valencia MD  May 10, 2025  8:19 PM

## 2025-05-12 ENCOUNTER — APPOINTMENT (OUTPATIENT)
Dept: OCCUPATIONAL THERAPY | Facility: HOSPITAL | Age: 45
End: 2025-05-12
Payer: COMMERCIAL

## 2025-05-12 ENCOUNTER — APPOINTMENT (OUTPATIENT)
Dept: PHYSICAL THERAPY | Facility: HOSPITAL | Age: 45
End: 2025-05-12
Payer: COMMERCIAL

## 2025-05-12 VITALS
BODY MASS INDEX: 29.23 KG/M2 | SYSTOLIC BLOOD PRESSURE: 134 MMHG | WEIGHT: 165 LBS | HEART RATE: 85 BPM | OXYGEN SATURATION: 100 % | DIASTOLIC BLOOD PRESSURE: 71 MMHG | RESPIRATION RATE: 16 BRPM | TEMPERATURE: 98.1 F

## 2025-05-12 LAB
ANION GAP SERPL CALCULATED.3IONS-SCNC: 8 MMOL/L (ref 7–15)
BUN SERPL-MCNC: 13.4 MG/DL (ref 6–20)
CALCIUM SERPL-MCNC: 8.1 MG/DL (ref 8.8–10.4)
CHLORIDE SERPL-SCNC: 108 MMOL/L (ref 98–107)
CREAT SERPL-MCNC: 0.76 MG/DL (ref 0.51–0.95)
EGFRCR SERPLBLD CKD-EPI 2021: >90 ML/MIN/1.73M2
ERYTHROCYTE [DISTWIDTH] IN BLOOD BY AUTOMATED COUNT: 12.1 % (ref 10–15)
GLUCOSE SERPL-MCNC: 102 MG/DL (ref 70–99)
HCO3 SERPL-SCNC: 23 MMOL/L (ref 22–29)
HCT VFR BLD AUTO: 30.7 % (ref 35–47)
HGB BLD-MCNC: 9.7 G/DL (ref 11.7–15.7)
MCH RBC QN AUTO: 33.6 PG (ref 26.5–33)
MCHC RBC AUTO-ENTMCNC: 31.6 G/DL (ref 31.5–36.5)
MCV RBC AUTO: 106 FL (ref 78–100)
PLATELET # BLD AUTO: 174 10E3/UL (ref 150–450)
POTASSIUM SERPL-SCNC: 3.9 MMOL/L (ref 3.4–5.3)
RBC # BLD AUTO: 2.89 10E6/UL (ref 3.8–5.2)
SODIUM SERPL-SCNC: 139 MMOL/L (ref 135–145)
WBC # BLD AUTO: 8.7 10E3/UL (ref 4–11)

## 2025-05-12 PROCEDURE — 97530 THERAPEUTIC ACTIVITIES: CPT | Mod: GP | Performed by: PHYSICAL THERAPIST

## 2025-05-12 PROCEDURE — 97116 GAIT TRAINING THERAPY: CPT | Mod: GP | Performed by: PHYSICAL THERAPIST

## 2025-05-12 PROCEDURE — 250N000013 HC RX MED GY IP 250 OP 250 PS 637: Performed by: INTERNAL MEDICINE

## 2025-05-12 PROCEDURE — 85041 AUTOMATED RBC COUNT: CPT | Performed by: INTERNAL MEDICINE

## 2025-05-12 PROCEDURE — 250N000013 HC RX MED GY IP 250 OP 250 PS 637: Performed by: STUDENT IN AN ORGANIZED HEALTH CARE EDUCATION/TRAINING PROGRAM

## 2025-05-12 PROCEDURE — 82565 ASSAY OF CREATININE: CPT | Performed by: INTERNAL MEDICINE

## 2025-05-12 PROCEDURE — 36415 COLL VENOUS BLD VENIPUNCTURE: CPT | Performed by: INTERNAL MEDICINE

## 2025-05-12 PROCEDURE — 97535 SELF CARE MNGMENT TRAINING: CPT | Mod: GO

## 2025-05-12 RX ORDER — ASPIRIN 81 MG/1
81 TABLET ORAL 2 TIMES DAILY
Qty: 60 TABLET | Refills: 0 | Status: SHIPPED | OUTPATIENT
Start: 2025-05-12

## 2025-05-12 RX ORDER — ACETAMINOPHEN 325 MG/1
650 TABLET ORAL EVERY 4 HOURS PRN
Qty: 100 TABLET | Refills: 0 | Status: SHIPPED | OUTPATIENT
Start: 2025-05-12

## 2025-05-12 RX ORDER — AMOXICILLIN 250 MG
1-2 CAPSULE ORAL 2 TIMES DAILY
Qty: 30 TABLET | Refills: 0 | Status: SHIPPED | OUTPATIENT
Start: 2025-05-12

## 2025-05-12 RX ORDER — POLYETHYLENE GLYCOL 3350 17 G/17G
1 POWDER, FOR SOLUTION ORAL DAILY
Qty: 7 PACKET | Refills: 0 | Status: SHIPPED | OUTPATIENT
Start: 2025-05-12

## 2025-05-12 RX ORDER — HYDROXYZINE HYDROCHLORIDE 25 MG/1
25 TABLET, FILM COATED ORAL EVERY 6 HOURS PRN
Qty: 30 TABLET | Refills: 0 | Status: SHIPPED | OUTPATIENT
Start: 2025-05-12

## 2025-05-12 RX ORDER — OXYCODONE HYDROCHLORIDE 5 MG/1
5-10 TABLET ORAL EVERY 4 HOURS PRN
Qty: 26 TABLET | Refills: 0 | Status: SHIPPED | OUTPATIENT
Start: 2025-05-12

## 2025-05-12 RX ORDER — METHOCARBAMOL 500 MG/1
500 TABLET, FILM COATED ORAL 4 TIMES DAILY
Qty: 12 TABLET | Refills: 0 | Status: SHIPPED | OUTPATIENT
Start: 2025-05-12

## 2025-05-12 RX ORDER — OXYCODONE HYDROCHLORIDE 5 MG/1
5 TABLET ORAL
Refills: 0 | Status: DISCONTINUED | OUTPATIENT
Start: 2025-05-12 | End: 2025-05-12 | Stop reason: HOSPADM

## 2025-05-12 RX ORDER — OXYCODONE HYDROCHLORIDE 5 MG/1
10 TABLET ORAL
Refills: 0 | Status: DISCONTINUED | OUTPATIENT
Start: 2025-05-12 | End: 2025-05-12 | Stop reason: HOSPADM

## 2025-05-12 RX ADMIN — ACETAMINOPHEN 975 MG: 325 TABLET ORAL at 06:20

## 2025-05-12 RX ADMIN — OXYCODONE HYDROCHLORIDE 5 MG: 5 TABLET ORAL at 01:50

## 2025-05-12 RX ADMIN — OXYCODONE HYDROCHLORIDE 5 MG: 5 TABLET ORAL at 08:52

## 2025-05-12 RX ADMIN — ASPIRIN 81 MG: 81 TABLET, COATED ORAL at 08:52

## 2025-05-12 RX ADMIN — OXYCODONE HYDROCHLORIDE 5 MG: 5 TABLET ORAL at 03:44

## 2025-05-12 RX ADMIN — ACETAMINOPHEN 975 MG: 325 TABLET ORAL at 14:32

## 2025-05-12 RX ADMIN — OXYCODONE HYDROCHLORIDE 5 MG: 5 TABLET ORAL at 11:57

## 2025-05-12 RX ADMIN — METHOCARBAMOL 500 MG: 500 TABLET ORAL at 08:52

## 2025-05-12 RX ADMIN — METHOCARBAMOL 500 MG: 500 TABLET ORAL at 14:32

## 2025-05-12 RX ADMIN — HYDROXYZINE HYDROCHLORIDE 25 MG: 25 TABLET, FILM COATED ORAL at 01:50

## 2025-05-12 ASSESSMENT — ACTIVITIES OF DAILY LIVING (ADL)
ADLS_ACUITY_SCORE: 33
ADLS_ACUITY_SCORE: 35
ADLS_ACUITY_SCORE: 33
ADLS_ACUITY_SCORE: 35
ADLS_ACUITY_SCORE: 33
ADLS_ACUITY_SCORE: 33
ADLS_ACUITY_SCORE: 35
ADLS_ACUITY_SCORE: 33
ADLS_ACUITY_SCORE: 35
ADLS_ACUITY_SCORE: 33
ADLS_ACUITY_SCORE: 33
ADLS_ACUITY_SCORE: 35
ADLS_ACUITY_SCORE: 33

## 2025-05-12 NOTE — PLAN OF CARE
Problem: Adult Inpatient Plan of Care  Goal: Plan of Care Review  Description: The Plan of Care Review/Shift note should be completed every shift.  The Outcome Evaluation is a brief statement about your assessment that the patient is improving, declining, or no change.  This information will be displayed automatically on your shiftnote.  5/11/2025 2158 by Lele Lea RN  Outcome: Progressing     Problem: Adult Inpatient Plan of Care  Goal: Optimal Comfort and Wellbeing  5/11/2025 2158 by Lele Lea RN  Outcome: Progressing     Problem: Pain Acute  Goal: Optimal Pain Control and Function  5/11/2025 2158 by Lele Lea RN  Outcome: Progressing     Problem: Mobility Impairment  Goal: Optimal Mobility  5/11/2025 2158 by Lele Lea RN  Outcome: Progressing  5/11/2025 2157 by Lele Lea RN  Outcome: Progressing  Intervention: Optimize Mobility  Recent Flowsheet Documentation  Taken 5/11/2025 2142 by Lele Lea RN  Positioning/Transfer Devices:   pillows   in use  Taken 5/11/2025 1930 by Lele Lea RN  Assistive Device Utilized:   gait belt   walker  Activity Management: back to bed  Positioning/Transfer Devices:   pillows   in use  Taken 5/11/2025 1925 by Lele Lea RN  Assistive Device Utilized:   gait belt   walker  Activity Management: up to bedside commode  Taken 5/11/2025 1656 by Lele Lae RN  Assistive Device Utilized:   gait belt   walker  Activity Management: up in chair  Positioning/Transfer Devices:   pillows   in use    Patient is alert and orientated, able to make need known. Pain controlled with PRN oxycodone, hydroxyzine, elevation and ice. Up to the bed side commode with the assist of one, gait belt and walker. CMS intact to operative extremity. UA sent to lab, no infection noted.     Lele Lea RN

## 2025-05-12 NOTE — PROGRESS NOTES
"Ortho Progress Note      Post-operative Day: 2      Subjective:  Patient is doing well on postop day 2.  She is sitting up in the chair during the time my visit.  She just finished working with physical therapy who notes that she was able to do stairs this morning.  Patient notes ongoing pain in her left ankle, particularly in the medial aspect of her heel.  Denies fever/chills today.  She does feel comfortable discharging home as pain is well-controlled on oral pain medications and is tolerable at rest.    Objective:  /71 (BP Location: Right arm)   Pulse 85   Temp 98.1  F (36.7  C) (Oral)   Resp 16   Wt 74.8 kg (165 lb)   SpO2 100%   BMI 29.23 kg/m    Gen: A&O x 3. NAD. Appears comfortable  Dressing clean dry and intact.  Compartments soft and compressible.  He does have intact dorsiflexion, plantarflexion, EHL.  No pain with passive stretch of the toes.  2+ DP, posterior tibialis pulse.  Digits warm and well-perfused with brisk capillary refill.    Pertinent Labs   Lab Results: personally reviewed.   No results found for: \"INR\", \"PROTIME\"  Lab Results   Component Value Date    WBC 8.7 05/12/2025    HGB 9.7 (L) 05/12/2025    HCT 30.7 (L) 05/12/2025     (H) 05/12/2025     05/12/2025     Lab Results   Component Value Date     05/12/2025    CO2 23 05/12/2025       Assessment: POD2 s/p IMN left tibia with Dr. Santillan    Plan:   Pain Control: Continue per multimodal pain protocol. Minimize opioid pain medications as able.  Weight Bearing: Weight bearing as tolerated in CAM boot and with walker/crutches at all times  Bracing: CAM boot at all times except hygiene, skin checks  DVT Prophylaxis: Risk stratified DVT prophylaxis. Aspirin 81 mg BID x 4 weeks. Early ambulation and SCDs while in bed.  Antibiotics: 24 hours of perioperative IV antibiotics  Diet: Advance diet as tolerated from orthopedic perspective  GI: Plan for aggressive bowel regimen to prevent constipation from opioid " medications  Lines: HLIV once tolerating PO  PT/OT: Eval and treatment.  Follow up:  2 weeks with Dr. Santillan team for wound check, suture removal, and XR.  Discharge plan: Doing well with PT/OT today.  Plan is to discharge home today, orthopedically stable for discharge from my standpoint    MAE MARRERO PA-C  05/12/2025

## 2025-05-12 NOTE — DISCHARGE SUMMARY
[facility]  Hospitalist Discharge Summary      Date of Admission:  5/10/2025  Date of Discharge:  5/12/2025  Discharging Provider: Muna Handy DO  Discharge Service: Hospitalist Service    Discharge Diagnoses   Alcohol use  Tibia shaft fracture  Fibular shaft fracture  Leukocytosis  Postoperative pain    Clinically Significant Risk Factors          Follow-ups Needed After Discharge   Follow-up Appointments       Follow Up Care      Follow-up with your Surgeon Team in 2 weeks for wound check. If you have any questions for the team before, contact Team Jacque using .                Unresulted Labs Ordered in the Past 30 Days of this Admission       No orders found from 4/10/2025 to 5/11/2025.            Discharge Disposition   Discharged to home  Condition at discharge: Stable    Hospital Course   Patient is a 45-year-old female who presented to the hospital after falling while drinking alcohol.  Presented with fibular and tibia fracture.  Underwent repair with orthopedic surgery.  Worked with physical therapy and was deemed appropriate for discharge to home.  Discharged with pain medications.  Stable.    Consultations This Hospital Stay   PHYSICAL THERAPY ADULT IP CONSULT  OCCUPATIONAL THERAPY ADULT IP CONSULT  CARE MANAGEMENT / SOCIAL WORK IP CONSULT  ORTHOPEDIC SURGERY IP CONSULT  PHYSICAL THERAPY ADULT IP CONSULT  OCCUPATIONAL THERAPY ADULT IP CONSULT    Code Status   Full Code    Time Spent on this Encounter   I, Muna Handy DO, personally saw the patient today and spent greater than 30 minutes discharging this patient.       DO [facility] [address] [address]  Phone: [phone]  Fax: [phone]  ______________________________________________________________________    Physical Exam   Vital Signs: Temp: 98.1  F (36.7  C) Temp src: Oral BP: 134/71 Pulse: 85   Resp: 16 SpO2: 100  "% O2 Device: None (Room air)    Weight: 165 lbs 0 oz         Primary Care Physician   No primary care provider on file.    Discharge Orders      Reason for your hospital stay    Left tibial fracture surgical fixation with Dr. Santillan.     When to call - Contact Surgeon Team    You may experience symptoms that require follow-up before your scheduled appointment. Refer to the \"Stoplight Tool\" for instructions on when to contact your Surgeon Team if you are concerned about pain control, blood clots, constipation, or if you are unable to urinate.     When to call - Reach out to Urgent Care    If you are not able to reach your Surgeon Team and you need immediate care, go to the Orthopedic Walk-in Clinic or Urgent Care at your Surgeon's office.  Do NOT go to the Emergency Room unless you have shortness of breath, chest pain, or other signs of a medical emergency.     When to call - Reasons to Call 911    Call 911 immediately if you experience sudden-onset chest pain, arm weakness/numbness, slurred speech, or shortness of breath     Discharge Instruction - Breathing exercises    Perform breathing exercises 10 times per hours while awake for 2 weeks. (If given, use your Incentive Spirometer)     Symptoms - Fever Management    A low grade fever can be expected after surgery.  Use acetaminophen (TYLENOL) as needed for fever management.  Contact your Surgeon Team if you have a fever greater than 101.5 F, chills, and/or night sweats.     Symptoms - Constipation management    Constipation (hard, dry bowel movements) is expected after surgery due to the combination of being less active, the anesthetic, and the opioid pain medication.  You can do the following to help reduce constipation:  ~  FLUIDS:  Drink clear liquids (water or Gatorade), or juice (apple/prune).  ~  DIET:  Eat a fiber rich diet.    ~  ACTIVITY:  Get up and move around several times a day.  Increase your activity as you are able.  MEDICATIONS:  Reduce the risk of " constipation by starting medications before you are constipated.  You can take Miralax   (1 packet as directed) and/or a stool softener (Senokot 1-2 tablets 1-2 times a day).  If you already have constipation and these medications are not working, you can get magnesium citrate and use as directed.  If you continue to have constipation you can try an over the counter suppository or enema.  Call your Surgeon Team if it has been greater than 3 days since your last bowel movement.     Symptoms - Reduced Urine Output    Changes in the amount of fluids you drank before and after surgery may result in problems urinating.  It is important to stay well-hydrated after surgery and drink plenty of water. If it has been greater than 8 hours since you have urinated despite drinking plenty of water, call your Surgeon Team.     Medication instructions -  Anticoagulation - aspirin    Take the aspirin as prescribed for a total of four weeks after surgery.  This is given to help minimize your risk of blood clot.     Activity - Exercises to prevent blood clots    Unless otherwise directed by your Surgeon team, perform the following exercises at least three times per day for the first four weeks after surgery to prevent blood clots in your legs: 1) Point and flex your feet (Ankle Pumps), 2) Move your ankle around in big circles, 3) Wiggle your toes, 4) Walk, even for short distances, several times a day, will help decrease the risk of blood clots.     Comfort and Pain Management - Pain after Surgery    Pain after surgery is normal and expected.  You will have some amount of pain for several weeks after surgery.  Your pain will improve with time.  There are several things you can do to help reduce your pain including: rest, ice, elevation, and using pain medications as needed. Contact your Surgeon Team if you have pain that persists or worsens after surgery despite rest, ice, elevation, and taking your medication(s) as prescribed.  Contact your Surgeon Team if you have new numbness, tingling, or weakness in your operative extremity.     Comfort and Pain Management - Swelling after Surgery    Swelling and/or bruising of the surgical extremity is common and may persist for several months after surgery. In addition to frequent icing and elevation, gentle compressive support with an ACE wrap or tubigrip may help with swelling. Apply compression regularly, removing at least twice daily to perform skin checks. Contact your Surgeon Team if your swelling increases and is NOT associated with an increase in your activity level, or if your swelling increases and is associated with redness and pain.     Comfort and Pain Management - Cold therapy    Ice can be used to control swelling and discomfort after surgery. Place a thin towel over your operative site and apply the ice pack overtop. Leave ice pack in place for 20 minutes, then remove for 20 minutes. Repeat this 20 minutes on/20 minutes off routine as often as tolerated.     Medication Instructions - Acetaminophen (TYLENOL) Instructions    You were discharged with acetaminophen (TYLENOL) for pain management after surgery. Acetaminophen most effectively manages pain symptoms when it is taken on a schedule without missing doses (every four, six, or eight hours). Your Provider will prescribe a safe daily dose between 3000 - 4000 mg.  Do NOT exceed this daily dose. Most patients use acetaminophen for pain control for the first four weeks after surgery.  You can wean from this medication as your pain decreases.     Medication Instructions - NSAID Instructions    You were discharged with an anti-inflammatory medication for pain management to use in combination with acetaminophen (TYLENOL) and the narcotic pain medication.  Take this medication exactly as directed.  You should only take one anti-inflammatory at a time.  Some common anti-inflammatories include: ibuprofen (ADVIL, MOTRIN), naproxen (ALEVE,  "NAPROSYN), celecoxib (CELEBREX), meloxicam (MOBIC), ketorolac (TORADOL).  Take this medication with food and water.     Follow Up Care    Follow-up with your Surgeon Team in 2 weeks for wound check. If you have any questions for the team before, contact Team Jacque using .     Comfort and Pain Management - LOWER Extremity Elevation    Swelling is expected for several months after surgery. This type of swelling is usually associated with gravity and activity, and can be improved with elevation.   The best way to do this is to get your \"toes above your nose\" by laying down and placing several pillows lengthwise under your calf and heel. When elevating your leg keep your knee completely straight. Perform this elevation as often as possible especially for the first two weeks after surgery.     Opioid Instructions (Less than 65 years)    You were discharged with an opioid medication (hydromorphone, oxycodone, hydrocodone, or tramadol). This medication should only be taken for breakthrough pain that is not controlled with acetaminophen (TYLENOL). If you rate your pain less than 3 you do not need this medication. Pain rating 0-3: You do not need this medication. Pain rating 4-6: Take 1 tablet every 4-6 hours as needed Pain rating 7-10: Take 2 tablets every 4-6 hours as needed. Do not exceed 6 tablets per day     Medication Instructions - Opioids - Tapering Instructions    In the first three days following surgery, your symptoms may warrant use of the narcotic pain medication every four to six hours as prescribed. This is normal. As your pain symptoms improve, focus your efforts on decreasing (tapering) use of narcotic medications. The most successful tapering strategy is to first, decrease the number of tablets you take every 4-6 hours to the minimum prescribed. Then, increase the amount of time between doses. For example: First, taper to   or 1 tablet every 4-6 hours. Then, taper to   or 1 tablet every 6-8 " hours. Then, taper to   or 1 tablet every 8-10 hours. Then, taper to   or 1 tablet every 10-12 hours. Then, taper to   or 1 tablet at bedtime. The bedtime dose can help with comfort during sleep and is typically the last dose to be discontinued after surgery.     Return to Driving    Return to driving - Driving is NOT permitted until directed by your provider. Under no circumstance are you permitted to drive while using narcotic pain medications.     Dressing / Wound Care - Wound    You have a clean dressing on your surgical wound. Dressing change instructions as follows: dressing will be removed at your follow-up appointment. Contact your Surgeon Team if you have increased redness, warmth around the surgical wound, and/or drainage from the surgical wound.     Dressing / Wound Care - NO Tub Bathing    Tub bathing, swimming, or any other activities that will cause your incision to be submerged in water should be avoided until allowed by your Surgeon.     Activity    Weight bear as tolerated in the cam boot with a walker.     Weight bearing as tolerated    Weight bearing as tolerated on your operative extremity.     Ankle Foot Orthotic (AFO) or Cam-Walker    Remove for twice daily for skin assessment and hygiene.  Wear AFO/CAM boot at all times except for skin assessment and hygiene. When removing, ensure that you are safely seated or lying in bed.     Dressing Wound Care - Shower with wound/dressing NOT covered    You do not need to cover your dressing or incision in the shower, you may allow water and soap to run over top of the surgical dressing or incision. You may shower 1 days after surgery.  You are strictly prohibited from soaking or submerging the surgical wound underwater.     Walker DME    DME Documentation: Describe the reason for need to support medical necessity: Impaired gait status post knee surgery. I, the undersigned, certify that the above prescribed supplies are medically necessary for this patient  and is both reasonable and necessary in reference to accepted standards of medical practice in the treatment of this patient's condition and is not prescribed as a convenience.     Discharge Instruction - Regular Diet Adult    Return to your pre-surgery diet unless instructed otherwise       Significant Results and Procedures   Most Recent 3 CBC's:  Recent Labs   Lab Test 05/12/25  0726 05/11/25  0613 05/10/25  1217   WBC 8.7 15.8* 13.0*   HGB 9.7* 11.1* 13.2   * 101* 101*    234 275     Most Recent 3 BMP's:  Recent Labs   Lab Test 05/12/25  0542 05/11/25  0613 05/10/25  1217    136 141   POTASSIUM 3.9 3.8 4.1   CHLORIDE 108* 104 108*   CO2 23 23 22   BUN 13.4 14.4 13.0   CR 0.76 0.73 0.81   ANIONGAP 8 9 11   CARYL 8.1* 7.8* 9.0   * 218* 108*     Most Recent 2 LFT's:  Recent Labs   Lab Test 04/02/24  0938   AST 21   ALT 28   ALKPHOS 66   BILITOTAL 0.7   ,   Results for orders placed or performed during the hospital encounter of 05/10/25   CT Tibia Fibula Lower Leg Left wo Contrast    Narrative    EXAM: CT TIBIA FIBULA LOWER LEG LEFT WO CONTRAST  LOCATION: [facility]  DATE: 5/10/2025    INDICATION: Eval ankle fx. Eval tib fib fx  COMPARISON:  None available.  TECHNIQUE: Noncontrast. Axial, sagittal and coronal thin-section reconstruction. Dose reduction techniques were used.     FINDINGS:     BONES:  - Acute, mildly displaced oblique fracture involving the proximal fibular shaft with up to 4 mm displacement.    Acute, displaced oblique fracture involving the distal one third of the tibial shaft with up to 9 mm displacement. There is nondisplaced oblique/spiral extension of the fracture through the base of the posterior malleolus and into the central aspect of   the tibial plafond at the tibiotalar joint in keeping with intra-articular extension of the fracture.    There is a small avulsive fracture fragment along the anterior aspect of the lateral malleolus near the  distal syndesmotic region, favor avulsive.    No additional fractures identified.    SOFT TISSUES:  -Soft tissue swelling and blood products about the mid to distal calf and ankle.      Impression    IMPRESSION:  1.  Mildly displaced distal tibia shaft fracture, with nondisplaced oblique/spiral extension distally into the tibiotalar joint by means of the tibial plafond at base of the posterior malleolus.    2.  Mildly displaced oblique fracture proximal fibular shaft.    3.  Small avulsion type fracture along the anterior aspect of the lateral malleolus near the distal syndesmotic region.    4.  Surrounding soft tissue swelling and poorly defined blood products.      NOTE: ABNORMAL REPORT    THE DICTATION ABOVE DESCRIBES AN ABNORMALITY FOR WHICH FOLLOW-UP IS NEEDED.    XR External Imaging Lower Extremity Left    Narrative    Images were obtained from an external facility.  Click PACS Images   hyperlink to view images.  Textual results have been scanned into the   media tab.   XR Surgery HOLA Fluoro L/T 5 Min    Narrative    This exam was marked as non-reportable because it will not be read by a   radiologist or a [place] non-radiologist provider.         XR Tibia & Fibula Port Left 2 Views    Narrative    EXAM: XR TIBIA and FIBULA PORT LEFT 2 VIEWS  LOCATION: [facility]  DATE: 5/10/2025    INDICATION: Tibal IM nail  COMPARISON: 5/10/2025      Impression    IMPRESSION:      There are immediate postoperative changes from intramedullary kassandra and screw fixation of the left tibial fracture in good anatomic alignment. Similar appearing fracture of the proximal fibular shaft.       Discharge Medications   Current Discharge Medication List        START taking these medications    Details   acetaminophen (TYLENOL) 325 MG tablet Take 2 tablets (650 mg) by mouth every 4 hours as needed for other (mild pain).  Qty: 100 tablet, Refills: 0    Associated Diagnoses: Tibia/fibula fracture, left, closed,  initial encounter      aspirin 81 MG EC tablet Take 1 tablet (81 mg) by mouth 2 times daily.  Qty: 60 tablet, Refills: 0    Associated Diagnoses: Tibia/fibula fracture, left, closed, initial encounter      hydrOXYzine HCl (ATARAX) 25 MG tablet Take 1 tablet (25 mg) by mouth every 6 hours as needed for other (adjuvant pain).  Qty: 30 tablet, Refills: 0    Associated Diagnoses: Tibia/fibula fracture, left, closed, initial encounter      methocarbamol (ROBAXIN) 500 MG tablet Take 1 tablet (500 mg) by mouth 4 times daily.  Qty: 12 tablet, Refills: 0    Associated Diagnoses: Tibia/fibula fracture, left, closed, initial encounter      oxyCODONE (ROXICODONE) 5 MG tablet Take 1-2 tablets (5-10 mg) by mouth every 4 hours as needed for moderate to severe pain (MDD: 6).  Qty: 26 tablet, Refills: 0    Associated Diagnoses: Tibia/fibula fracture, left, closed, initial encounter      polyethylene glycol (MIRALAX) 17 g packet Take 17 g by mouth daily.  Qty: 7 packet, Refills: 0    Associated Diagnoses: Tibia/fibula fracture, left, closed, initial encounter      senna-docusate (SENOKOT-S/PERICOLACE) 8.6-50 MG tablet Take 1-2 tablets by mouth 2 times daily. Take while on oral narcotics to prevent or treat constipation.  Qty: 30 tablet, Refills: 0    Comments: While taking narcotics  Associated Diagnoses: Tibia/fibula fracture, left, closed, initial encounter           CONTINUE these medications which have NOT CHANGED    Details   levonorgestrel (MIRENA) 52 MG (20 mcg/day) IUD by Intrauterine route once.           Allergies   Allergies   Allergen Reactions    Dramamine Ii [Meclizine] Unknown     Seizure

## 2025-05-12 NOTE — PLAN OF CARE
Problem: Adult Inpatient Plan of Care  Goal: Absence of Hospital-Acquired Illness or Injury  Intervention: Identify and Manage Fall Risk  Recent Flowsheet Documentation  Taken 5/12/2025 0852 by Vicki Garza RN  Safety Promotion/Fall Prevention:   activity supervised   assistive device/personal items within reach  Intervention: Prevent Skin Injury  Recent Flowsheet Documentation  Taken 5/12/2025 0852 by Vicki Garza RN  Body Position: lower extremity elevated  Intervention: Prevent and Manage VTE (Venous Thromboembolism) Risk  Recent Flowsheet Documentation  Taken 5/12/2025 0852 by Vicki Garza RN  VTE Prevention/Management: SCDs off (sequential compression devices)  Intervention: Prevent Infection  Recent Flowsheet Documentation  Taken 5/12/2025 0852 by Vicki Garza RN  Infection Prevention: hand hygiene promoted  Goal: Optimal Comfort and Wellbeing  Intervention: Monitor Pain and Promote Comfort  Recent Flowsheet Documentation  Taken 5/12/2025 0852 by Vicki Garza RN  Pain Management Interventions:   medication (see MAR)   cold applied  Intervention: Provide Person-Centered Care  Recent Flowsheet Documentation  Taken 5/12/2025 0852 by Vicki Garza RN  Trust Relationship/Rapport: thoughts/feelings acknowledged     Problem: Skin Injury Risk Increased  Goal: Skin Health and Integrity  Intervention: Plan: Nurse Driven Intervention: Moisture Management  Recent Flowsheet Documentation  Taken 5/12/2025 0852 by Vicki Garza RN  Moisture Interventions:   Encourage regular toileting   No brief in bed   Incontinence pad  Taken 5/12/2025 0800 by Vicki Garza RN  Moisture Interventions:   Encourage regular toileting   Incontinence pad  Bathing/Skin Care: other (see comments)  Intervention: Plan: Nurse Driven Intervention: Friction and Shear  Recent Flowsheet Documentation  Taken 5/12/2025 0852 by Vicki Garza RN  Friction/Shear Interventions: HOB 30  degrees or less  Intervention: Optimize Skin Protection  Recent Flowsheet Documentation  Taken 5/12/2025 0852 by Vicki Garza RN  Activity Management:   activity adjusted per tolerance   activity encouraged   up in chair  Head of Bed (HOB) Positioning: HOB at 30 degrees     Problem: Pain Acute  Goal: Optimal Pain Control and Function  Intervention: Optimize Psychosocial Wellbeing  Recent Flowsheet Documentation  Taken 5/12/2025 0852 by Vicki Garza RN  Diversional Activities: television  Intervention: Develop Pain Management Plan  Recent Flowsheet Documentation  Taken 5/12/2025 0852 by Vicki Garza RN  Pain Management Interventions:   medication (see MAR)   cold applied  Intervention: Prevent or Manage Pain  Recent Flowsheet Documentation  Taken 5/12/2025 0852 by Vicki Garza RN  Bowel Elimination Promotion: adequate fluid intake promoted  Medication Review/Management: medications reviewed     Problem: Mobility Impairment  Goal: Optimal Mobility  Intervention: Optimize Mobility  Recent Flowsheet Documentation  Taken 5/12/2025 0852 by Vicki Garza RN  Assistive Device Utilized:   gait belt   walker  Activity Management:   activity adjusted per tolerance   activity encouraged   up in chair  Positioning/Transfer Devices:   pillows   in use  Taken 5/12/2025 0851 by Vicki Garza RN  Positioning/Transfer Devices:   pillows   in use     Problem: Comorbidity Management  Goal: Blood Pressure in Desired Range  Intervention: Maintain Blood Pressure Management  Recent Flowsheet Documentation  Taken 5/12/2025 0852 by Vicki Garza RN  Medication Review/Management: medications reviewed   Goal Outcome Evaluation:       Pt alert & oriented x4. VS stable on RA air. Pain controlled w/ scheduled robaxin & PRN oxy. CIWA discontinued. Discharge orders placed. Discharge paperwork discussed & given to pt & significant other. PIV removed, dischrage medications given to pt. Sent home w/  a walker. PT discharged at 1530 via wheelchair.

## 2025-05-12 NOTE — PROGRESS NOTES
Cross cover note:    Page by nursing asking to change the as needed oxycodone 10 mg order to every 3 hours to match the 5 mg oxycodone order.    Reviewed notes, this change was made.

## 2025-05-12 NOTE — PLAN OF CARE
Problem: Pain Acute  Goal: Optimal Pain Control and Function  Outcome: Progressing  Intervention: Develop Pain Management Plan  Recent Flowsheet Documentation  Taken 5/12/2025 0620 by Mariaa Hills RN  Pain Management Interventions:   medication (see MAR)   cold applied  Taken 5/12/2025 0150 by Mariaa Hills RN  Pain Management Interventions: medication (see MAR)  Intervention: Prevent or Manage Pain  Recent Flowsheet Documentation  Taken 5/12/2025 0006 by Mariaa Hills RN  Medication Review/Management: medications reviewed   Goal Outcome Evaluation: Pain controlled with prn oxycodone & Atarax plus scheduled Tylenol & ice. No IV pain medication needed overnight.     Problem: Mobility Impairment  Goal: Optimal Mobility  Outcome: Progressing  Intervention: Optimize Mobility  Recent Flowsheet Documentation  Taken 5/12/2025 0146 by Mariaa Hills RN  Assistive Device Utilized:   gait belt   walker  Activity Management: ambulated to bathroom   Ambulated to bathroom with walker and assist x1, CAM boot on.     CMS intact to LLE, knee kept in full extension to prevent contraction.

## 2025-05-12 NOTE — PROGRESS NOTES
Occupational Therapy Discharge Summary    Reason for therapy discharge:    Discharged to home.    Progress towards therapy goal(s). See goals on Care Plan in [facility] electronic health record for goal details.  Goals met    Therapy recommendation(s):    No further therapy is recommended.

## 2025-08-31 ENCOUNTER — HEALTH MAINTENANCE LETTER (OUTPATIENT)
Age: 45
End: 2025-08-31

## (undated) DEVICE — SU DERMABOND ADVANCED .7ML DNX12

## (undated) DEVICE — GLOVE BIOGEL PI INDICATOR 8.0 LF 41680

## (undated) DEVICE — DRSG STERI STRIP 1/2X4" R1547

## (undated) DEVICE — CAST PADDING 4" STERILE 9044S

## (undated) DEVICE — LOCKING SCREW
Type: IMPLANTABLE DEVICE | Site: TIBIA | Status: NON-FUNCTIONAL
Brand: T2 ALPHA

## (undated) DEVICE — SUTURE VICRYL+ 2-0 27IN CT-1 UND VCP259H

## (undated) DEVICE — GLOVE UNDER INDICATOR PI SZ 7.0 LF 41670

## (undated) DEVICE — DRAPE CONVERTORS U-DRAPE 60X72" 8476

## (undated) DEVICE — SOL NACL 0.9% IRRIG 1000ML BOTTLE 2F7124

## (undated) DEVICE — GLOVE BIOGEL PI ULTRATOUCH G SZ 7.0 42170

## (undated) DEVICE — SU VICRYL+ 0 27IN CT-1 UND VCP260H

## (undated) DEVICE — SUTURE MONOCRYL 3-0 18 PS2 UND MCP497G

## (undated) DEVICE — PULSE LAVAGE IRRIGATION SYSTEM 0210-114-100

## (undated) DEVICE — Device

## (undated) DEVICE — CUSTOM PACK TOTAL KNEE SOP5BTKHEC

## (undated) DEVICE — SUCTION MANIFOLD NEPTUNE 2 SYS 4 PORT 0702-020-000

## (undated) DEVICE — DRILL BIT TWIST STRK CAN 2.7MM W/AO COUPLING 702449

## (undated) DEVICE — SUTURE VICRYL+ 1 27IN CT-1 UND VCP261H

## (undated) DEVICE — TIP SUCTION FRAIZER 10FR DISP 163

## (undated) DEVICE — REAMER SHAFT MODIFIED TRINKLE 8X510MM 0227-8510S

## (undated) DEVICE — SOL WATER IRRIG 1000ML BOTTLE 2F7114

## (undated) DEVICE — DRAPE C-ARMOR 5 SIDED 5523

## (undated) DEVICE — ESU GROUND PAD ADULT REM W/15' CORD E7507DB

## (undated) DEVICE — PREP CHLORAPREP 26ML TINTED HI-LITE ORANGE 930815

## (undated) DEVICE — DRSG GAUZE 4X4" TRAY 6939

## (undated) DEVICE — SU ETHILON 2-0 FS 18" 664G

## (undated) DEVICE — DRAPE C-ARM 60X42" 1013

## (undated) DEVICE — BIT DRL 130MM 4.2MM FRHD STRL LF

## (undated) DEVICE — DRILL SRG 360MM LCK 4.2MM STRL

## (undated) DEVICE — SOL ISOPROPYL RUBBING ALCOHOL USP 70% 4OZ HDX-20 I0020

## (undated) DEVICE — SLEEVE HLD 8-11MM T2 NL ELC INS STRL

## (undated) DEVICE — DRAPE U SPLIT 74X120" 29440

## (undated) DEVICE — SOL NACL 0.9% IRRIG 3000ML BAG 2B7127

## (undated) DEVICE — DRSG MEPILEX BORDER ADHS 10CMX20CM STRL 496405

## (undated) DEVICE — SU STRATAFIX PDS PLUS 1 CT-1 18" SXPP1A404

## (undated) DEVICE — DRSG MEPILEX BORDER FLEX 3X3" 595200

## (undated) DEVICE — GLOVE BIOGEL PI ULTRATOUCH G SZ 8.0 42180

## (undated) RX ORDER — PROPOFOL 10 MG/ML
INJECTION, EMULSION INTRAVENOUS
Status: DISPENSED
Start: 2025-05-10

## (undated) RX ORDER — FENTANYL CITRATE 50 UG/ML
INJECTION, SOLUTION INTRAMUSCULAR; INTRAVENOUS
Status: DISPENSED
Start: 2025-05-10